# Patient Record
Sex: FEMALE | Race: WHITE | NOT HISPANIC OR LATINO | Employment: OTHER | URBAN - METROPOLITAN AREA
[De-identification: names, ages, dates, MRNs, and addresses within clinical notes are randomized per-mention and may not be internally consistent; named-entity substitution may affect disease eponyms.]

---
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
## 2023-01-27 ASSESSMENT — ANXIETY QUESTIONNAIRES
2. NOT BEING ABLE TO STOP OR CONTROL WORRYING: NEARLY EVERY DAY
8. IF YOU CHECKED OFF ANY PROBLEMS, HOW DIFFICULT HAVE THESE MADE IT FOR YOU TO DO YOUR WORK, TAKE CARE OF THINGS AT HOME, OR GET ALONG WITH OTHER PEOPLE?: EXTREMELY DIFFICULT
7. FEELING AFRAID AS IF SOMETHING AWFUL MIGHT HAPPEN: NEARLY EVERY DAY
GAD7 TOTAL SCORE: 20
IF YOU CHECKED OFF ANY PROBLEMS ON THIS QUESTIONNAIRE, HOW DIFFICULT HAVE THESE PROBLEMS MADE IT FOR YOU TO DO YOUR WORK, TAKE CARE OF THINGS AT HOME, OR GET ALONG WITH OTHER PEOPLE: EXTREMELY DIFFICULT
6. BECOMING EASILY ANNOYED OR IRRITABLE: MORE THAN HALF THE DAYS
7. FEELING AFRAID AS IF SOMETHING AWFUL MIGHT HAPPEN: NEARLY EVERY DAY
GAD7 TOTAL SCORE: 20
5. BEING SO RESTLESS THAT IT IS HARD TO SIT STILL: NEARLY EVERY DAY
GAD7 TOTAL SCORE: 20
3. WORRYING TOO MUCH ABOUT DIFFERENT THINGS: NEARLY EVERY DAY
1. FEELING NERVOUS, ANXIOUS, OR ON EDGE: NEARLY EVERY DAY
4. TROUBLE RELAXING: NEARLY EVERY DAY

## 2023-02-02 ENCOUNTER — APPOINTMENT (OUTPATIENT)
Dept: GENERAL RADIOLOGY | Facility: CLINIC | Age: 18
End: 2023-02-02
Attending: EMERGENCY MEDICINE
Payer: COMMERCIAL

## 2023-02-02 ENCOUNTER — HOSPITAL ENCOUNTER (EMERGENCY)
Facility: CLINIC | Age: 18
Discharge: HOME | End: 2023-02-02
Attending: EMERGENCY MEDICINE | Admitting: EMERGENCY MEDICINE
Payer: COMMERCIAL

## 2023-02-02 ENCOUNTER — OFFICE VISIT (OUTPATIENT)
Dept: URGENT CARE | Facility: URGENT CARE | Age: 18
End: 2023-02-02
Payer: COMMERCIAL

## 2023-02-02 VITALS
SYSTOLIC BLOOD PRESSURE: 124 MMHG | DIASTOLIC BLOOD PRESSURE: 84 MMHG | TEMPERATURE: 97.5 F | HEART RATE: 78 BPM | RESPIRATION RATE: 20 BRPM | OXYGEN SATURATION: 98 %

## 2023-02-02 VITALS
SYSTOLIC BLOOD PRESSURE: 120 MMHG | TEMPERATURE: 98 F | RESPIRATION RATE: 20 BRPM | DIASTOLIC BLOOD PRESSURE: 78 MMHG | OXYGEN SATURATION: 98 % | HEART RATE: 78 BPM

## 2023-02-02 DIAGNOSIS — S62.615A CLOSED DISPLACED FRACTURE OF PROXIMAL PHALANX OF LEFT RING FINGER, INITIAL ENCOUNTER: ICD-10-CM

## 2023-02-02 PROCEDURE — 73140 X-RAY EXAM OF FINGER(S): CPT | Mod: LT

## 2023-02-02 PROCEDURE — 26725 TREAT FINGER FRACTURE EACH: CPT | Mod: F8

## 2023-02-02 PROCEDURE — 99284 EMERGENCY DEPT VISIT MOD MDM: CPT

## 2023-02-02 RX ORDER — TRAMADOL HYDROCHLORIDE 50 MG/1
50 TABLET ORAL EVERY 6 HOURS PRN
Qty: 10 TABLET | Refills: 0 | Status: SHIPPED | OUTPATIENT
Start: 2023-02-02 | End: 2023-02-05

## 2023-02-02 RX ORDER — BUPIVACAINE HYDROCHLORIDE 5 MG/ML
5 INJECTION, SOLUTION EPIDURAL; INTRACAUDAL ONCE
Status: DISCONTINUED | OUTPATIENT
Start: 2023-02-02 | End: 2023-02-02 | Stop reason: HOSPADM

## 2023-02-02 ASSESSMENT — ENCOUNTER SYMPTOMS: ARTHRALGIAS: 1

## 2023-02-02 ASSESSMENT — ACTIVITIES OF DAILY LIVING (ADL)
ADLS_ACUITY_SCORE: 35
ADLS_ACUITY_SCORE: 33

## 2023-02-02 NOTE — ED TRIAGE NOTES
Pt arrives with c/o hand injury. Pt has obvious dislocation of left ring finger. Pt reports she slipped and fell while taking out the trash around 1030. CMS intact in triage.      Triage Assessment     Row Name 02/02/23 1149       Triage Assessment (Adult)    Airway WDL WDL       Respiratory WDL    Respiratory WDL WDL       Skin Circulation/Temperature WDL    Skin Circulation/Temperature WDL WDL       Cardiac WDL    Cardiac WDL WDL       Peripheral/Neurovascular WDL    Peripheral Neurovascular WDL WDL       Cognitive/Neuro/Behavioral WDL    Cognitive/Neuro/Behavioral WDL WDL

## 2023-02-02 NOTE — ED PROVIDER NOTES
History     Chief Complaint:  Hand Injury       HPI   Sera Card is a 17 year old female who presents with a left hand injury. Patient reports that she slipped and fell while taking out the trash. She has obvious deformity in the left fourth finger. The patient denies hitting her head during the fall. Patient is right handed. Patient denies any other complaints.     Independent Historian: Patients mother was present but provided no pertinent history.     Review of External Notes: No recent visits for review.    ROS:  Review of Systems   Musculoskeletal: Positive for arthralgias.   All other systems reviewed and are negative.    Allergies:  No Known Allergies     Medications:    Fluoxetine  fluarix    Past Medical History:    Warts of foot    Family History:    Anxiety  Cancer  Depression x 2  diabetes    Social History:  Reports that she has never smoked. She has never used smokeless tobacco. She reports that she does not drink alcohol and does not use drugs.    Physical Exam     Patient Vitals for the past 24 hrs:   BP Temp Temp src Pulse Resp SpO2   02/02/23 1148 (!) 135/94 97.5  F (36.4  C) Temporal 117 20 100 %      Physical Exam  Constitutional: Alert, attentive, GCS 15   HENT:   Eyes: EOM are normal, anicteric, conjugate gaze  CV: distal extremities warm, well perfused  Chest: Non-labored breathing on RA  MSK: Left fourth digit is ulnarly deviated at the MTP. Distal sensation and cap refill intact.   Neurological: Alert, attentive, moving all extremities equally.   Skin: Skin is warm and dry.    Emergency Department Course     Imaging:  Fingers XR, 2-3 views, left   Final Result   IMPRESSION: Horizontal fracture through the proximal metaphysis of the   fourth finger proximal phalanx with ulnar deviation of the fourth   finger. No additional fractures are evident.      ANDREW GREEN MD            SYSTEM ID:  [id number]         Report per radiology    Laboratory:  Labs Ordered and Resulted from Time  of ED Arrival to Time of ED Departure - No data to display     Procedures      Digital Block       Procedure: Digital Block    Indication: Wound care    Consent: Verbal    Preparation: Betadine    Anesthesia: Trans thecal digital block was performed with Bupivacaine - 0.5% on the affected digit.     Location: L fourth (ring) finger    Procedure Detail: A digital block was performed on the indicated digit with the above anesthetic.     Patient status: The patient tolerated the procedure well: Yes.  She had incomplete anesthesia so a conventional digital block was performed in a similar fashion with Betadine.  This resulted in complete anesthesia.      Fracture Reduction     Procedure: Fracture Reduction     Indication: Fracture    Location: Left fourth finger    Consent: Verbal from Patient   Risks (including but not limited to: neurologic compromise, vascular injury, pain, and inability to reduce fracture), benefits, and alternatives were discussed with patient and consent for procedure was obtained.     Universal Protocol: Universal protocol was followed and time out conducted just prior to starting procedure, confirming patient identity, site/side, procedure, patient position, and availability of correct equipment and implants.     Anesthesia/Sedation: Digital block as above    Procedure Detail: I manually manipulated and reduced the fracture incompletely, I was able to reduce it approximately 50 to 60% into a much more anatomical alignment but it was not completely reduced despite several attempts.  She was placed in a intrinsic plus/ulnar gutter    Patient Status:  The patient tolerated the procedure well: Yes. There were no complications.    Emergency Department Course & Assessments:    Interventions:  Medications   bupivacaine (MARCAINE) 0.5% preservative free injection (has no administration in time range)        Independent Interpretation (X-rays, CTs, rhythm strip):  I independently reviewed the patient's  Xray.      Assessments:  1307 I examined the patient and obtained history as noted above.  1349 I rechecked the patient and completed digital block. See above procedure note.      Disposition:  The patient was discharged to home.     Impression & Plan      Medical Decision Makin-year-old right-hand-dominant woman presenting for evaluation of slip and fall landing on her outstretched left hand clipping on the edge of a brick wall.  She denies hitting her head has no neck pain she has a clear deformity to her left fourth digit.  X-rays confirm transverse fracture at the base of the proximal fourth digit phalanx without clear evidence of intra-articular spread.  I did perform a chance the full than classic digital block to get additional anesthesia I was able to partially reduce this improving alignment by 50 to 60% from its ulnar deviation.  I splinted her in intrinsic plus position of comfort, we will have her follow-up with hand surgery.  I did place a referral to the hand surgery line.  Return precautions reviewed she was discharged home.      Diagnosis:    ICD-10-CM    1. Closed displaced fracture of proximal phalanx of left ring finger, initial encounter  S62.615A            Discharge Medications:  New Prescriptions    TRAMADOL (ULTRAM) 50 MG TABLET    Take 1 tablet (50 mg) by mouth every 6 hours as needed for severe pain (7-10)     Roel Medina MD  [employer]  2:55 PM 23        Scribe Disclosure:  I, Ruiz Schilling, am serving as a scribe at 1:07 PM on 2023 to document services personally performed by Roel Medina MD based on my observations and the provider's statements to me.     2023   Roel Medina MD Dunbar, John Forrest, MD  23 [phone]

## 2023-02-02 NOTE — DISCHARGE INSTRUCTIONS
I recommend altering dose of Tylenol and ibuprofen to help with your pain.  You should elevate your hand above your heart as this will help with the swelling.  You can ice your hand through your splint but your splint cannot get wet.  Your splint should not get wet when you shower as well.  You can take the tramadol as needed for more severe pain.  Should you develop severe pain, or persistent tingling to your finger or notice your finger changes colors beyond bruising you should return here to the emergency department.

## 2023-02-03 ENCOUNTER — HOSPITAL ENCOUNTER (OUTPATIENT)
Dept: BEHAVIORAL HEALTH | Facility: CLINIC | Age: 18
Discharge: HOME | End: 2023-02-03
Attending: FAMILY MEDICINE
Payer: COMMERCIAL

## 2023-02-03 DIAGNOSIS — F41.9 ANXIETY: ICD-10-CM

## 2023-02-03 DIAGNOSIS — F33.1 MODERATE EPISODE OF RECURRENT MAJOR DEPRESSIVE DISORDER (H): ICD-10-CM

## 2023-02-03 PROBLEM — F32.A DEPRESSION: Status: ACTIVE | Noted: 2023-02-03

## 2023-02-03 PROCEDURE — 999N000216 HC STATISTIC ADULT CD FACE TO FACE-NO CHRG: Mod: GT,95 | Performed by: COUNSELOR

## 2023-02-03 ASSESSMENT — COLUMBIA-SUICIDE SEVERITY RATING SCALE - C-SSRS
2. HAVE YOU ACTUALLY HAD ANY THOUGHTS OF KILLING YOURSELF IN THE PAST MONTH?: NO
1. IN THE PAST MONTH, HAVE YOU WISHED YOU WERE DEAD OR WISHED YOU COULD GO TO SLEEP AND NOT WAKE UP?: NO
5. HAVE YOU STARTED TO WORK OUT OR WORKED OUT THE DETAILS OF HOW TO KILL YOURSELF? DO YOU INTEND TO CARRY OUT THIS PLAN?: NO
6. HAVE YOU EVER DONE ANYTHING, STARTED TO DO ANYTHING, OR PREPARED TO DO ANYTHING TO END YOUR LIFE?: NO
3. HAVE YOU BEEN THINKING ABOUT HOW YOU MIGHT KILL YOURSELF?: NO
4. HAVE YOU HAD THESE THOUGHTS AND HAD SOME INTENTION OF ACTING ON THEM?: NO

## 2023-02-03 ASSESSMENT — PATIENT HEALTH QUESTIONNAIRE - PHQ9: SUM OF ALL RESPONSES TO PHQ QUESTIONS 1-9: 19

## 2023-02-03 NOTE — PROGRESS NOTES
"      [facility]    ADULT Mental Health Assessment Appointment Note    Patient name:  Sera Card    Preferred Pronoun:   MRN: [id number]  YOB: 2005  Address:  [address] [place] [address]  Preferred phone: [phone]   May we leave a referral related message: Yes    Date of service: 23  Start time: 1:30pm   End time: 2:00pm   Service modality:  Video Visit:      Provider verified identity through the following two step process.  Patient provided:  Patient  and Patient address    Telemedicine Visit: The patient's condition can be safely assessed and treated via synchronous audio and visual telemedicine encounter.      Reason for Telemedicine Visit: Services only offered telehealth    Originating Site (Patient Location): Patient's home    Distant Site (Provider Location): [facility]    Consent:  The patient/guardian has verbally consented to: the potential risks and benefits of telemedicine (video visit) versus in person care; bill my insurance or make self-payment for services provided; and responsibility for payment of non-covered services.     Patient would like the video invitation sent by:  My Chart    Mode of Communication:  Video Conference via [employer]    Distant Location (Provider):  Off-site    As the provider I attest to compliance with applicable laws and regulations related to telemedicine.    Identifying Information:  Patient is a 17 year old,  Patient was referred for an assessment by self.  Patient attended the session alone. Patient identified their preferred language to be English. Patient reported they does not need the assistance of an  or other support involved in therapy.     Services Requested:   The reason for seeking services at this time is: \" Mental Health \"  Patient has attempted to resolve these concerns in the past.  Patient does not have legal " concerns.    Mental Health:  Review of Symptoms per patient report:  Depression: Change in sleep, Lack of interest, Excessive or inappropriate guilt, Change in energy level, Difficulties concentrating, Psychomotor slowing or agitation, Feelings of hopelessness, Feelings of helplessness, Low self-worth, Ruminations, Irritability, Feeling sad, down, or depressed, Withdrawn, Poor hygeine and Frequent crying  Bailee:  No Symptoms  Psychosis: No Symptoms  Anxiety: Excessive worry, Nervousness, Physical complaints, such as headaches, stomachaches, muscle tension, Separation anxiety, Social anxiety, Sleep disturbance, Psychomotor agitation, Ruminations, Poor concentration and Irritability  Panic:  Palpitations, Tremors, Shortness of breath and Sense of impending doom  Post Traumatic Stress Disorder:  No Symptoms   Eating Disorder: No Symptoms  ADD / ADHD:  Inattentive, Difficulties listening, Poor task completion, Poor organizational skills, Distractibility, Forgetful, Interrupts, Impulsive, Restlessness/fidgety and Hyperverbal  Conduct Disorder: No symptoms  Autism Spectrum Disorder: Deficits in social communication and social interactions  Obsessive Compulsive Disorder: No Symptoms    Patient reports the following compulsive behaviors and treatment history: Nail biting .        Substance Use:  Do you  have a history of alcohol or illicit drug use? Cannabis last used 01/27/2023      Significant Losses / Trauma / Abuse / Neglect Issues:   Patient did not serve in the .  There are indications or report of significant loss, trauma, abuse or neglect issues related to: are no indications and client denies any losses, trauma, abuse, or neglect concerns.  Concerns for possible neglect are not present.     Medical History:  Patient reports the following current medical concerns: Broken finger and may have to get surgery soon.  Patient denies any issues with pain..   There are not significant appetite / nutritional concerns  / weight changes.   Patient does not report a history of head injury / trauma / cognitive impairment.      Current Mental Status Exam:   Appearance:  Appropriate    Eye Contact:  Good   Psychomotor:  Agitated   Attitude / Demeanor: Cooperative   Speech Rate:  Normal/ Responsive  Volume:  Normal  volume  Language:  no problems  Mood:   Anxious  Agitated  Affect:   Appropriate    Thought Content: Clear   Thought Process: Coherent     Associations:  No loosening of associations  Insight:   Good   Judgment:  Intact   Orientation:  All  Attention:  Good    Rating Scales:  PHQ9:    PHQ-9 SCORE 12/8/2020 1/5/2021 2/3/2023   PHQ-9 Total Score - - 19   PHQ-A Total Score 17 11 -   ;    GAD7:    JULIANO-7 SCORE 12/8/2020 1/5/2021 1/27/2023   Total Score - - 20 (severe anxiety)   Total Score 18 11 20     Banks Suicide Severity Rating Scale (Lifetime/Recent)  Banks Suicide Severity Rating (Lifetime/Recent) 2/3/2023   Q1 Wished to be Dead (Past Month) no   Q2 Suicidal Thoughts (Past Month) no   Q3 Suicidal Thought Method no   Q4 Suicidal Intent without Specific Plan no   Q5 Suicide Intent with Specific Plan no   Q6 Suicide Behavior (Lifetime) no   Level of Risk per Screen low risk       Safety Assessment:   Patient denies current homicidal ideation and behaviors.  Patient denies current self-injurious ideation and behaviors.    Patient denied risk behaviors associated with substance use.  Patient denies any high risk behaviors associated with mental health symptoms.  Patient reports the following current concerns for their personal safety: None.  Patient reports there are not  firearms in the house. There are no firearms in the home..     Clinical Impressions:  Generalized Anxiety Disorder  A. Excessive anxiety and worry about a number of events or activities (such as work or school performance).   B. The person finds it difficult to control the worry.   - Restlessness or feeling keyed up or on edge.    - Being easily fatigued.     - Difficulty concentrating or mind going blank.    - Irritability.    - Muscle tension.    - Sleep disturbance (difficulty falling or staying asleep, or restless unsatisfying sleep).   D. The focus of the anxiety and worry is not confined to features of an Axis I disorder.  E. The anxiety, worry, or physical symptoms cause clinically significant distress or impairment in social, occupational, or other important areas of functioning.   F. The disturbance is not due to the direct physiological effects of a substance (e.g., a drug of abuse, a medication) or a general medical condition (e.g., hyperthyroidism) and does not occur exclusively during a Mood Disorder, a Psychotic Disorder, or a Pervasive Developmental Disorder. Major Depressive Disorder  A) Recurrent episode(s) - symptoms have been present during the same 2-week period and represent a change from previous functioning 5 or more symptoms (required for diagnosis)   - Depressed mood. Note: In children and adolescents, can be irritable mood.     - Diminished interest or pleasure in all, or almost all, activities.    - Psychomotor activity agitation.    - Fatigue or loss of energy.    - Feelings of worthlessness or inappropriate and excessive guilt.    - Diminished ability to think or concentrate, or indecisiveness.   B) The symptoms cause clinically significant distress or impairment in social, occupational, or other important areas of functioning  C) The episode is not attributable to the physiological effects of a substance or to another medical condition  D) The occurence of major depressive episode is not better explained by other thought / psychotic disorders  E) There has never been a manic episode or hypomanic episode      Clinical Substantiation:  Summary of Visit: Patient is a 17 year old female with no previous mental health history. Patient is experiencing symptoms of depression and anxiety.  Patient presented extremely nervous. Clinician explained  the DA to pt and the process. Pt would like therapy and psychiatry. Pt does not have a history of psychiatric hospitalizations. Patient does not have a history of SI, SA or SIB. Denies any concerns with current alcohol use.  Patient would benefit from additional support and psycho education to manage current mental health symptoms.    The patient's acute suicide risk was determined to be low due to the following factors: Denial of suicidal thoughts, no history of suicide attempts. Patient is not currently under the influence of alcohol or illicit substances, denies experiencing command hallucinations, and has no immediate access to firearms. The patient's acute risk could be higher if noncompliant with their follow-up appointments or using illicit substances or alcohol.    Therapeutic Interventions Provided: supportive active listening, explored alternatives and emotional validation    Recommendations/Plan: therapy and medication management    Referral:     Date: Saturday, 2/4/2023  Time: 9:00 am - 10:00 am  Provider: Loreta Lindo MA  LMFT  Location: [facility], [address] [place] [address], [address], [place] [address]  Phone: [phone]  Type: Teletherapy    Date: Wednesday, 2/8/2023  Time: 2:00 pm - 3:00 pm  Provider: Ashlie MADRIGAL PA-C  Location: [facility], [address], [address], [place] [address]  Phone: [phone]  Type: Telepsychiatry    Patient Instructions  Please fill New Patient Form by using following link. All forms need to be completed 24 hours prior to the appointment date/time by going to [email or web address] Please call us on [phone] 24 hours prior to your scheduled appointment to confirm that you are able to attend. We will provide you information about how to log into video call software when you call.    Claudia Mendez, [facility],  February 3, 2023  Mental Health and Addiction Services Assessment Center

## 2023-02-03 NOTE — PATIENT INSTRUCTIONS
Date: Saturday, 2/4/2023  Time: 9:00 am - 10:00 am  Provider: Loreta Lindo MA  LMFT  Location: [facility], [address] [facility], [address], [place] [address]  Phone: [phone]  Type: Teletherapy    Date: Wednesday, 2/8/2023  Time: 2:00 pm - 3:00 pm  Provider: Ashlie MADRIGAL PA-C  Location: [facility], [address] [place] [address], [address], [place] [address]  Phone: [phone]  Type: Telepsychiatry    Patient Instructions  Please fill New Patient Form by using following link. All forms need to be completed 24 hours prior to the appointment date/time by going to [email or web address] Please call us on [phone] 24 hours prior to your scheduled appointment to confirm that you are able to attend. We will provide you information about how to log into video call software when you call.

## 2023-04-06 ENCOUNTER — OFFICE VISIT (OUTPATIENT)
Dept: OPTOMETRY | Facility: CLINIC | Age: 18
End: 2023-04-06
Payer: COMMERCIAL

## 2023-04-06 DIAGNOSIS — H52.223 REGULAR ASTIGMATISM OF BOTH EYES: ICD-10-CM

## 2023-04-06 DIAGNOSIS — Z01.00 EXAMINATION OF EYES AND VISION: Primary | ICD-10-CM

## 2023-04-06 PROCEDURE — 92004 COMPRE OPH EXAM NEW PT 1/>: CPT | Performed by: OPTOMETRIST

## 2023-04-06 PROCEDURE — 92015 DETERMINE REFRACTIVE STATE: CPT | Performed by: OPTOMETRIST

## 2023-04-06 RX ORDER — HYDROXYZINE HYDROCHLORIDE 50 MG/1
TABLET, FILM COATED ORAL
COMMUNITY
Start: 2023-04-04

## 2023-04-06 ASSESSMENT — TONOMETRY
OS_IOP_MMHG: 10
IOP_METHOD: TONOPEN
OD_IOP_MMHG: 13

## 2023-04-06 ASSESSMENT — REFRACTION_WEARINGRX
OD_CYLINDER: +0.75
OD_AXIS: 105
OS_CYLINDER: +0.50
OS_SPHERE: -0.50
OD_SPHERE: -0.75
OS_AXIS: 062
SPECS_TYPE: DOES NOT USE

## 2023-04-06 ASSESSMENT — REFRACTION_MANIFEST
OD_SPHERE: -0.50
OS_SPHERE: -0.75
OS_CYLINDER: +0.75
OS_AXIS: 062
OD_AXIS: 105
OD_CYLINDER: +0.75
METHOD_AUTOREFRACTION: 1

## 2023-04-06 ASSESSMENT — VISUAL ACUITY
OS_SC: 20/25
OD_SC: 20/25
OD_SC: 20/20-1
METHOD: SNELLEN - LINEAR
OS_SC: 20/20-1
OS_SC+: -1
OD_SC+: -2

## 2023-04-06 ASSESSMENT — SLIT LAMP EXAM - LIDS
COMMENTS: NORMAL
COMMENTS: NORMAL

## 2023-04-06 ASSESSMENT — EXTERNAL EXAM - LEFT EYE: OS_EXAM: NORMAL

## 2023-04-06 ASSESSMENT — CONF VISUAL FIELD
OD_NORMAL: 1
OS_INFERIOR_TEMPORAL_RESTRICTION: 0
OD_SUPERIOR_TEMPORAL_RESTRICTION: 0
OS_NORMAL: 1
OS_INFERIOR_NASAL_RESTRICTION: 0
OD_SUPERIOR_NASAL_RESTRICTION: 0
OD_INFERIOR_NASAL_RESTRICTION: 0
OS_SUPERIOR_TEMPORAL_RESTRICTION: 0
OD_INFERIOR_TEMPORAL_RESTRICTION: 0
OS_SUPERIOR_NASAL_RESTRICTION: 0

## 2023-04-06 ASSESSMENT — EXTERNAL EXAM - RIGHT EYE: OD_EXAM: NORMAL

## 2023-04-06 ASSESSMENT — CUP TO DISC RATIO
OS_RATIO: 0.1
OD_RATIO: 0.1

## 2023-04-06 ASSESSMENT — KERATOMETRY
OD_K2POWER_DIOPTERS: 45.50
OD_K1POWER_DIOPTERS: 44.25
OS_AXISANGLE_DEGREES: 073
OD_AXISANGLE2_DEGREES: 013
OD_AXISANGLE_DEGREES: 103
OS_AXISANGLE2_DEGREES: 163
OS_K1POWER_DIOPTERS: 44.25
OS_K2POWER_DIOPTERS: 45.50

## 2023-04-06 NOTE — PROGRESS NOTES
Chief Complaint   Patient presents with     Annual Eye Exam         Last Eye Exam: 10-  Dilated Previously: Yes    What are you currently using to see?  does not use glasses or contacts       Distance Vision Acuity: Noticed gradual change in both eyes    Near Vision Acuity: Satisfied with vision while reading  unaided    Eye Comfort: good  Do you use eye drops? : No  Occupation or Hobbies: student - going to [employer]    Keren Galvan Optometric Assistant, A.B.O.CDebbie      Medical, surgical and family histories reviewed and updated 4/6/2023.       OBJECTIVE: See Ophthalmology exam    ASSESSMENT:    ICD-10-CM    1. Examination of eyes and vision  Z01.00       2. Regular astigmatism of both eyes  H52.223           PLAN:     Patient Instructions   Eyeglass prescription given.    Return in 1 year for a complete eye exam or sooner if needed.    Colin Barrientos, OD

## 2023-04-06 NOTE — LETTER
4/6/2023         RE: Sera Card  [address] [place] [address]        Dear Colleague,    Thank you for referring your patient, Sera Card, to the [facility]. Please see a copy of my visit note below.    Chief Complaint   Patient presents with     Annual Eye Exam         Last Eye Exam: 10-  Dilated Previously: Yes    What are you currently using to see?  does not use glasses or contacts       Distance Vision Acuity: Noticed gradual change in both eyes    Near Vision Acuity: Satisfied with vision while reading  unaided    Eye Comfort: good  Do you use eye drops? : No  Occupation or Hobbies: student - going to [employer]    Keren Galvan Optometric Assistant, A.B.ODebbieC.      Medical, surgical and family histories reviewed and updated 4/6/2023.       OBJECTIVE: See Ophthalmology exam    ASSESSMENT:    ICD-10-CM    1. Examination of eyes and vision  Z01.00       2. Regular astigmatism of both eyes  H52.223           PLAN:     Patient Instructions   Eyeglass prescription given.    Return in 1 year for a complete eye exam or sooner if needed.    Colin Barrientos, SANTHOSH           Again, thank you for allowing me to participate in the care of your patient.        Sincerely,        Colin Barrientos, OD

## 2023-04-06 NOTE — PATIENT INSTRUCTIONS
Eyeglass prescription given.    Return in 1 year for a complete eye exam or sooner if needed.    Colin Barrientos, SANTHOSH    The affects of the dilating drops last for 4- 6 hours.  You will be more sensitive to light and vision will be blurry up close.  Do not drive if you do not feel comfortable.  Mydriatic sunglasses were given if needed.      Optometry Providers       Clinic Locations                                 Telephone Number   Dr. Macy Schilling [place]    [facility]  [facility] [phone]     [place] [facility] Hours:                [place] [facility] Hours:       [place] [facility] Hours:   [address]   [address]      [address] [place] [address]   [place] [address]    [place] [address]  Phone: [phone]                    Phone: [phone]     Phone: [phone]                      Monday 8:00-6:00                          Monday 8:00-6:00                          Monday 8:00-6:00              Tuesday 8:00-6:00                          Tuesday 8:00-6:00                          Tuesday 8:00-6:00              Wednesday 8:00-6:00                  Wednesday 8:00-6:00                   Wednesday 8:00-6:00      Thursday 8:00-6:00                        Thursday 8:00-6:00                         Thursday 8:00-6:00            Friday 8:00-5:00                              Friday 8:00-5:00                              Friday 8:00-5:00    [facility] Hours:   [address] [place], [place] [id number] [phone]    Monday 9:00-6:00  Tuesday 9:00-6:00  Wednesday 9:00-6:00  Thursday 9:00-6:00  Friday 9:00-5:00  As always, Thank you for trusting us with your health care needs!

## 2023-10-07 ENCOUNTER — HEALTH MAINTENANCE LETTER (OUTPATIENT)
Age: 18
End: 2023-10-07

## 2023-10-13 ENCOUNTER — OFFICE VISIT (OUTPATIENT)
Dept: FAMILY MEDICINE | Facility: CLINIC | Age: 18
End: 2023-10-13
Payer: COMMERCIAL

## 2023-10-13 VITALS
SYSTOLIC BLOOD PRESSURE: 108 MMHG | RESPIRATION RATE: 10 BRPM | TEMPERATURE: 98.2 F | WEIGHT: 114.8 LBS | BODY MASS INDEX: 18.45 KG/M2 | HEIGHT: 66 IN | DIASTOLIC BLOOD PRESSURE: 64 MMHG | HEART RATE: 77 BPM | OXYGEN SATURATION: 99 %

## 2023-10-13 DIAGNOSIS — K58.2 IRRITABLE BOWEL SYNDROME WITH BOTH CONSTIPATION AND DIARRHEA: Primary | ICD-10-CM

## 2023-10-13 DIAGNOSIS — F32.1 CURRENT MODERATE EPISODE OF MAJOR DEPRESSIVE DISORDER WITHOUT PRIOR EPISODE (H): ICD-10-CM

## 2023-10-13 DIAGNOSIS — K59.4 RECTAL SPHINCTER SPASM: ICD-10-CM

## 2023-10-13 DIAGNOSIS — Z11.4 SCREENING FOR HIV (HUMAN IMMUNODEFICIENCY VIRUS): ICD-10-CM

## 2023-10-13 DIAGNOSIS — Z11.59 NEED FOR HEPATITIS C SCREENING TEST: ICD-10-CM

## 2023-10-13 LAB
BASO+EOS+MONOS # BLD AUTO: NORMAL 10*3/UL
BASO+EOS+MONOS NFR BLD AUTO: NORMAL %
BASOPHILS # BLD AUTO: 0 10E3/UL (ref 0–0.2)
BASOPHILS NFR BLD AUTO: 0 %
EOSINOPHIL # BLD AUTO: 0.1 10E3/UL (ref 0–0.7)
EOSINOPHIL NFR BLD AUTO: 2 %
ERYTHROCYTE [DISTWIDTH] IN BLOOD BY AUTOMATED COUNT: 13.4 % (ref 10–15)
HCT VFR BLD AUTO: 38 % (ref 35–47)
HGB BLD-MCNC: 12.3 G/DL (ref 11.7–15.7)
IMM GRANULOCYTES # BLD: 0 10E3/UL
IMM GRANULOCYTES NFR BLD: 0 %
LYMPHOCYTES # BLD AUTO: 1.7 10E3/UL (ref 0.8–5.3)
LYMPHOCYTES NFR BLD AUTO: 32 %
MCH RBC QN AUTO: 31.5 PG (ref 26.5–33)
MCHC RBC AUTO-ENTMCNC: 32.4 G/DL (ref 31.5–36.5)
MCV RBC AUTO: 97 FL (ref 78–100)
MONOCYTES # BLD AUTO: 0.3 10E3/UL (ref 0–1.3)
MONOCYTES NFR BLD AUTO: 6 %
NEUTROPHILS # BLD AUTO: 3.2 10E3/UL (ref 1.6–8.3)
NEUTROPHILS NFR BLD AUTO: 60 %
PLATELET # BLD AUTO: 242 10E3/UL (ref 150–450)
RBC # BLD AUTO: 3.91 10E6/UL (ref 3.8–5.2)
WBC # BLD AUTO: 5.4 10E3/UL (ref 4–11)

## 2023-10-13 PROCEDURE — 90471 IMMUNIZATION ADMIN: CPT | Performed by: FAMILY MEDICINE

## 2023-10-13 PROCEDURE — 90686 IIV4 VACC NO PRSV 0.5 ML IM: CPT | Performed by: FAMILY MEDICINE

## 2023-10-13 PROCEDURE — 87389 HIV-1 AG W/HIV-1&-2 AB AG IA: CPT | Performed by: FAMILY MEDICINE

## 2023-10-13 PROCEDURE — 80053 COMPREHEN METABOLIC PANEL: CPT | Performed by: FAMILY MEDICINE

## 2023-10-13 PROCEDURE — 90480 ADMN SARSCOV2 VAC 1/ONLY CMP: CPT | Performed by: FAMILY MEDICINE

## 2023-10-13 PROCEDURE — 85025 COMPLETE CBC W/AUTO DIFF WBC: CPT | Performed by: FAMILY MEDICINE

## 2023-10-13 PROCEDURE — 84443 ASSAY THYROID STIM HORMONE: CPT | Performed by: FAMILY MEDICINE

## 2023-10-13 PROCEDURE — 91320 SARSCV2 VAC 30MCG TRS-SUC IM: CPT | Performed by: FAMILY MEDICINE

## 2023-10-13 PROCEDURE — 99214 OFFICE O/P EST MOD 30 MIN: CPT | Mod: 25 | Performed by: FAMILY MEDICINE

## 2023-10-13 PROCEDURE — 36415 COLL VENOUS BLD VENIPUNCTURE: CPT | Performed by: FAMILY MEDICINE

## 2023-10-13 PROCEDURE — 86803 HEPATITIS C AB TEST: CPT | Performed by: FAMILY MEDICINE

## 2023-10-13 RX ORDER — TRAZODONE HYDROCHLORIDE 50 MG/1
50 TABLET, FILM COATED ORAL AT BEDTIME
COMMUNITY

## 2023-10-13 RX ORDER — CLONIDINE HYDROCHLORIDE 0.1 MG/1
0.1 TABLET, EXTENDED RELEASE ORAL DAILY
COMMUNITY
Start: 2023-10-11 | End: 2023-11-29

## 2023-10-13 ASSESSMENT — PATIENT HEALTH QUESTIONNAIRE - PHQ9
10. IF YOU CHECKED OFF ANY PROBLEMS, HOW DIFFICULT HAVE THESE PROBLEMS MADE IT FOR YOU TO DO YOUR WORK, TAKE CARE OF THINGS AT HOME, OR GET ALONG WITH OTHER PEOPLE: SOMEWHAT DIFFICULT
SUM OF ALL RESPONSES TO PHQ QUESTIONS 1-9: 16
SUM OF ALL RESPONSES TO PHQ QUESTIONS 1-9: 16

## 2023-10-13 ASSESSMENT — PAIN SCALES - GENERAL: PAINLEVEL: NO PAIN (0)

## 2023-10-13 NOTE — PROGRESS NOTES
Assessment & Plan     (K58.2) Irritable bowel syndrome with both constipation and diarrhea  (primary encounter diagnosis)  Comment: pt  has gi problems for one month: sometimes she had hard stool and difficulty to pass stool. Sometimes she had diarrhea. Sometimes she felt low abd pain. Symptoms not related to any food. Denies fever, diet change, weight loss, n/v. She is someday smoker and she uses marijuana.  Denies alcohol and spicy food. She works as nanny. Exam is not remarkable. Likely IBS. Vs constipation.   Plan: CBC with platelets and differential, TSH with         free T4 reflex, Comprehensive metabolic panel         (BMP + Alb, Alk Phos, ALT, AST, Total. Bili,         TP)        Will follow-up lab, advise to push water. Increase diet fiber such as sweet potato, banana, oatmeal, all vegetable etc. Quit smoke. Will follow-up in one month. If symptoms worse will consider gi consult.     (K59.4) Rectal sphincter spasm  Comment:  pt state sometimes when she has constipation, she feels rectal spasm and painful. No blood in stool. Rectal exam is normal. Likely rectal sphincter spasm  Plan: advise to avoid constipation by pushing water and diet fiber. Consider otc stool softer if she continue to have constipation.     (Z11.4) Screening for HIV (human immunodeficiency virus)  Comment:   Plan: HIV Antigen Antibody Combo            (Z11.59) Need for hepatitis C screening test  Comment:   Plan: Hepatitis C Screen Reflex to HCV RNA Quant and         Genotype            (F32.1) Current moderate episode of major depressive disorder without prior episode (H)  Comment: pt takes medication and follow-up with psychiatrist. Doing well  Plan: continue current plan.           Nicotine/Tobacco Cessation:  She reports that she has been smoking cigarettes. She has never used smokeless tobacco.  Nicotine/Tobacco Cessation Plan:   Information offered: Patient not interested at this time      Depression Screening Follow Up         10/13/2023     9:42 AM   PHQ   PHQ-9 Total Score 16   Q9: Thoughts of better off dead/self-harm past 2 weeks Not at all         10/13/2023     9:42 AM   Last PHQ-9   1.  Little interest or pleasure in doing things 2   2.  Feeling down, depressed, or hopeless 1   3.  Trouble falling or staying asleep, or sleeping too much 3   4.  Feeling tired or having little energy 3   5.  Poor appetite or overeating 3   6.  Feeling bad about yourself 1   7.  Trouble concentrating 3   8.  Moving slowly or restless 0   Q9: Thoughts of better off dead/self-harm past 2 weeks 0   PHQ-9 Total Score 16       Follow Up Actions Taken       See Patient Instructions. Pt is due for pe and she agrees to follow-up for pe.     Shania Chery MD  [facility]IB[facility] Zamora is a 18 year old, presenting for the following health issues:  Rectal Problem (Rectal pain, bowel pain, straining, frequent urge to go, not fully emptying her colon x 4 months. Has tried miralax and OTC stool softeners and had no relief. Pt has been massaging her muscles in her butt and her abdomen and feels that helps just a moment but not helping with bowel movements. )      10/13/2023     9:50 AM   Additional Questions   Roomed by Isabella SHELL CMA       History of Present Illness       Reason for visit:  Trouble with bowels and those muscles needed for pooping  Symptom onset:  More than a month  Symptoms include:  Straining to poop/involuntary tensing of those muscles/feel like i cant complete bowel movements or something is stuck/pain around butthole muscles/anal discharge  Symptom intensity:  Severe  Symptom progression:  Staying the same  Had these symptoms before:  No  What makes it worse:  Straining  What makes it better:  Putting pressure on those muscles    She eats 2-3 servings of fruits and vegetables daily.She consumes 2 sweetened beverage(s) daily.She exercises with enough effort to increase her heart rate 30 to 60 minutes per day.   "She exercises with enough effort to increase her heart rate 4 days per week. She is missing 2 dose(s) of medications per week.  She is not taking prescribed medications regularly due to remembering to take.          Review of Systems   Constitutional, HEENT, cardiovascular, pulmonary, gi and gu systems are negative, except as otherwise noted.      Objective    /64 (BP Location: Left arm, Patient Position: Sitting, Cuff Size: Adult Regular)   Pulse 77   Temp 98.2  F (36.8  C) (Oral)   Resp 10   Ht 1.664 m (5' 5.5\")   Wt 52.1 kg (114 lb 12.8 oz)   LMP 09/24/2023 (Exact Date)   SpO2 99%   Breastfeeding No   BMI 18.81 kg/m    Body mass index is 18.81 kg/m .  Physical Exam   GENERAL: healthy, alert and no distress  NECK: no adenopathy, no asymmetry, masses, or scars and thyroid normal to palpation  RESP: lungs clear to auscultation - no rales, rhonchi or wheezes  CV: regular rate and rhythm, normal S1 S2, no S3 or S4, no murmur, click or rub, no peripheral edema and peripheral pulses strong  ABDOMEN: soft, nontender, no hepatosplenomegaly, no masses and bowel sounds normal  MS: no gross musculoskeletal defects noted, no edema   Rectum normal without masses, blood.                "

## 2023-10-14 LAB
ALBUMIN SERPL BCG-MCNC: 4.6 G/DL (ref 3.5–5.2)
ALP SERPL-CCNC: 66 U/L (ref 35–104)
ALT SERPL W P-5'-P-CCNC: 13 U/L (ref 0–50)
ANION GAP SERPL CALCULATED.3IONS-SCNC: 9 MMOL/L (ref 7–15)
AST SERPL W P-5'-P-CCNC: 22 U/L (ref 0–45)
BILIRUB SERPL-MCNC: 0.2 MG/DL
BUN SERPL-MCNC: 9.4 MG/DL (ref 6–20)
CALCIUM SERPL-MCNC: 9.4 MG/DL (ref 8.6–10)
CHLORIDE SERPL-SCNC: 101 MMOL/L (ref 98–107)
CREAT SERPL-MCNC: 0.67 MG/DL (ref 0.51–0.95)
DEPRECATED HCO3 PLAS-SCNC: 26 MMOL/L (ref 22–29)
EGFRCR SERPLBLD CKD-EPI 2021: >90 ML/MIN/1.73M2
GLUCOSE SERPL-MCNC: 94 MG/DL (ref 70–99)
HCV AB SERPL QL IA: NONREACTIVE
HIV 1+2 AB+HIV1 P24 AG SERPL QL IA: NONREACTIVE
POTASSIUM SERPL-SCNC: 4.3 MMOL/L (ref 3.4–5.3)
PROT SERPL-MCNC: 7.2 G/DL (ref 6.4–8.3)
SODIUM SERPL-SCNC: 136 MMOL/L (ref 135–145)
TSH SERPL DL<=0.005 MIU/L-ACNC: 1.08 UIU/ML (ref 0.3–4.2)

## 2023-11-17 DIAGNOSIS — F90.0 ATTENTION DEFICIT HYPERACTIVITY DISORDER, INATTENTIVE TYPE: Primary | ICD-10-CM

## 2023-11-29 ENCOUNTER — OFFICE VISIT (OUTPATIENT)
Dept: FAMILY MEDICINE | Facility: CLINIC | Age: 18
End: 2023-11-29
Payer: COMMERCIAL

## 2023-11-29 VITALS
WEIGHT: 107 LBS | DIASTOLIC BLOOD PRESSURE: 70 MMHG | RESPIRATION RATE: 16 BRPM | HEART RATE: 105 BPM | BODY MASS INDEX: 17.83 KG/M2 | TEMPERATURE: 98.1 F | OXYGEN SATURATION: 98 % | SYSTOLIC BLOOD PRESSURE: 108 MMHG | HEIGHT: 65 IN

## 2023-11-29 DIAGNOSIS — R10.2 PELVIC PAIN IN FEMALE: ICD-10-CM

## 2023-11-29 DIAGNOSIS — Z00.00 ROUTINE GENERAL MEDICAL EXAMINATION AT A HEALTH CARE FACILITY: Primary | ICD-10-CM

## 2023-11-29 DIAGNOSIS — K58.1 IRRITABLE BOWEL SYNDROME WITH CONSTIPATION: ICD-10-CM

## 2023-11-29 PROBLEM — F32.A DEPRESSION: Status: RESOLVED | Noted: 2023-02-03 | Resolved: 2023-11-29

## 2023-11-29 PROCEDURE — 90677 PCV20 VACCINE IM: CPT | Performed by: FAMILY MEDICINE

## 2023-11-29 PROCEDURE — 99213 OFFICE O/P EST LOW 20 MIN: CPT | Mod: 25 | Performed by: FAMILY MEDICINE

## 2023-11-29 PROCEDURE — 90471 IMMUNIZATION ADMIN: CPT | Performed by: FAMILY MEDICINE

## 2023-11-29 PROCEDURE — 90472 IMMUNIZATION ADMIN EACH ADD: CPT | Performed by: FAMILY MEDICINE

## 2023-11-29 PROCEDURE — 99395 PREV VISIT EST AGE 18-39: CPT | Mod: 25 | Performed by: FAMILY MEDICINE

## 2023-11-29 PROCEDURE — 90715 TDAP VACCINE 7 YRS/> IM: CPT | Performed by: FAMILY MEDICINE

## 2023-11-29 RX ORDER — ATOMOXETINE 40 MG/1
40 CAPSULE ORAL DAILY
COMMUNITY
Start: 2023-11-17 | End: 2024-04-11

## 2023-11-29 ASSESSMENT — ENCOUNTER SYMPTOMS
HEADACHES: 0
WEAKNESS: 0
SORE THROAT: 0
FREQUENCY: 0
MYALGIAS: 1
COUGH: 0
JOINT SWELLING: 0
DIARRHEA: 0
DIZZINESS: 0
BREAST MASS: 0
NERVOUS/ANXIOUS: 0
NAUSEA: 0
PALPITATIONS: 0
HEARTBURN: 0
FEVER: 0
EYE PAIN: 0
HEMATURIA: 0
PARESTHESIAS: 0
HEMATOCHEZIA: 0
ARTHRALGIAS: 0
ABDOMINAL PAIN: 1
CONSTIPATION: 0
DYSURIA: 0
CHILLS: 0
SHORTNESS OF BREATH: 0

## 2023-11-29 ASSESSMENT — PATIENT HEALTH QUESTIONNAIRE - PHQ9
SUM OF ALL RESPONSES TO PHQ QUESTIONS 1-9: 14
10. IF YOU CHECKED OFF ANY PROBLEMS, HOW DIFFICULT HAVE THESE PROBLEMS MADE IT FOR YOU TO DO YOUR WORK, TAKE CARE OF THINGS AT HOME, OR GET ALONG WITH OTHER PEOPLE: SOMEWHAT DIFFICULT
SUM OF ALL RESPONSES TO PHQ QUESTIONS 1-9: 14

## 2023-11-29 NOTE — COMMUNITY RESOURCES LIST (ENGLISH)
11/29/2023   [facility]  N/A  For additional resource needs, please contact your health insurance member services or your primary care team.  Phone: [phone]   Email: N/A   Address: [address] [place] [address]   Hours: N/A        Food and Nutrition       Food pantry  1  [facility] Distance: 1.29 miles      Pickup   [address] [place] [address]  Language: Upper sorbian, English, Hmong, Macie, Liberian  Hours: Mon 1:00 PM - 4:00 PM , Mon 5:00 PM - 6:30 PM , Tue - Fri 9:00 AM - 11:30 AM , Tue - Fri 1:00 PM - 3:30 PM  Fees: Free   Phone: (154) 612-1558 Website: https://[facility].org/     2  [facility] Distance: 2.15 miles      [address] [address]  Language: English, Hmong, Arabic, Liberian  Hours: Mon 11:00 AM - 3:00 PM , Wed 11:00 AM - 3:00 PM , Fri 11:00 AM - 3:00 PM  Fees: Free, Self Pay   Phone: (240) 141-1736 Email: Edmund@[facility] HospitalWeb Africay.org Website: http://[facility].org/[facility]/[address]/     [facility] application assistance  3  [employer] [place] Distance: 3.22 miles      Phone/Virtual   [address] [place] [address]  Language: English, Hmong, Scottish, Cypriot, Liberian  Hours: Mon - Fri 8:30 AM - 4:30 PM  Fees: Free   Phone: (313) 691-2020 Email: helpline@[employer].org Website: https://www.[employer].org/programs/[place]-food-helpline/     4  [facility] Out[facility] Distance: 1.29 miles      Phone/Virtual   [address] [place] [address]  Language: Upper sorbian, English, Hmong, Macie, Liberian  Hours: Mon - Fri 10:00 AM - 12:00 PM , Mon - Fri 2:00 PM - 4:00 PM  Fees: Free   Phone: (423) 492-5194 Website: https://[facility].org/     Soup kitchen or free meals  [facility] Office - [place] and [facility] Distance: 1.19 miles      [place] [address]  [place] [address]  Language: English  Hours: Mon - Fri 5:00 PM - 6:00 PM  Fees: Free   Phone: (798) 985-8991 Email: mario@[facility] [place] [facility] Website: http://www.[facility] [place].[facility]/     6  [facility] [place] [facility] Distance: 2.53 miles      Pickup   [address] [place] [address]  Language: English  Hours: Tue 2:00 PM - 4:00 PM , Thu 2:00 PM - 4:00 PM  Fees: Free   Phone: (312) 599-9865 Email: roseann@[facility]. Website: https://www.[facility]/facilities/xnkszm-obvwgfoop-nqgteh          Important Numbers & Websites       [address].org  Poison Control   (909) 329-9359 [place]po[place]  Suicide and Crisis Lifeline   [address] [place], [place] [address]line.org  [facility] [place] Child Abuse Hotline   141.869.7984 Childhelphotline.org  National Sexual Assault Hotline   (320) 800-1067 (HOPE) Rainn.[facility]  National Runaway Safeline   (877) 233-2860 (RUNAWAY) Formerly named [facility] Cent[facility]unaway.org  Pregnancy & Postpartum Support [place]   Call/text 819-050-1573 Ppsupport[place].org  Substance Abuse National Helpline ([facility]   611-745-HELP (1886) Findtreatment.gov  Emergency Services   911

## 2023-11-29 NOTE — PROGRESS NOTES
SUBJECTIVE:   Sera is a 18 year old, presenting for the following:  Physical        11/29/2023     9:35 AM   Additional Questions   Roomed by Gen JENNIFER CMA       Healthy Habits:     Getting at least 3 servings of Calcium per day:  NO    Bi-annual eye exam:  Yes    Dental care twice a year:  Yes    Sleep apnea or symptoms of sleep apnea:  Daytime drowsiness    Diet:  Regular (no restrictions)    Frequency of exercise:  1 day/week    Duration of exercise:  30-45 minutes    Taking medications regularly:  Yes    Medication side effects:  None    Additional concerns today:  Yes      Today's PHQ-9 Score:       11/29/2023     8:29 AM   PHQ-9 SCORE   PHQ-9 Total Score MyChart 14 (Moderate depression)   PHQ-9 Total Score 14            Have you ever done Advance Care Planning? (For example, a Health Directive, POLST, or a discussion with a medical provider or your loved ones about your wishes): No, advance care planning information given to patient to review.  Advanced care planning was discussed at today's visit.    Social History     Tobacco Use    Smoking status: Passive Smoke Exposure - Never Smoker    Smokeless tobacco: Current    Tobacco comments:     I would smoke a cig maybe once a day when I worked at a [employer]   Substance Use Topics    Alcohol use: No             11/29/2023     8:41 AM   Alcohol Use   Prescreen: >3 drinks/day or >7 drinks/week? Not Applicable     Reviewed orders with patient.  Reviewed health maintenance and updated orders accordingly - Yes  Lab work is in process  Labs reviewed in EPIC    Breast Cancer Screening:        History of abnormal Pap smear: NO - under age 21, PAP not appropriate for age     Reviewed and updated as needed this visit by clinical staff   Tobacco  Allergies  Meds  Problems  Med Hx  Surg Hx  Fam Hx          Reviewed and updated as needed this visit by Provider   Tobacco  Allergies  Meds  Problems  Med Hx  Surg Hx  Fam Hx             Review of Systems  "  Constitutional:  Negative for chills and fever.   HENT:  Negative for congestion, ear pain, hearing loss and sore throat.    Eyes:  Negative for pain and visual disturbance.   Respiratory:  Negative for cough and shortness of breath.    Cardiovascular:  Negative for chest pain, palpitations and peripheral edema.   Gastrointestinal:  Positive for abdominal pain. Negative for constipation, diarrhea, heartburn, hematochezia and nausea.   Breasts:  Negative for tenderness, breast mass and discharge.   Genitourinary:  Positive for pelvic pain and urgency. Negative for dysuria, frequency, genital sores, hematuria, vaginal bleeding and vaginal discharge.   Musculoskeletal:  Positive for myalgias. Negative for arthralgias and joint swelling.   Skin:  Negative for rash.   Neurological:  Negative for dizziness, weakness, headaches and paresthesias.   Psychiatric/Behavioral:  Negative for mood changes. The patient is not nervous/anxious.           OBJECTIVE:   /70 (BP Location: Right arm, Patient Position: Sitting, Cuff Size: Adult Regular)   Pulse 105   Temp 98.1  F (36.7  C) (Oral)   Resp 16   Ht 1.645 m (5' 4.75\")   Wt 48.5 kg (107 lb)   LMP 11/23/2023 (Exact Date)   SpO2 98%   Breastfeeding No   BMI 17.94 kg/m    Physical Exam  GENERAL: healthy, alert and no distress  EYES: Eyes grossly normal to inspection, PERRL and conjunctivae and sclerae normal  HENT: ear canals and TM's normal, nose and mouth without ulcers or lesions  NECK: no adenopathy, no asymmetry, masses, or scars and thyroid normal to palpation  RESP: lungs clear to auscultation - no rales, rhonchi or wheezes  BREAST: normal without masses, tenderness or nipple discharge and no palpable axillary masses or adenopathy  CV: regular rate and rhythm, normal S1 S2, no S3 or S4, no murmur, click or rub, no peripheral edema and peripheral pulses strong  ABDOMEN: soft, nontender, no hepatosplenomegaly, no masses and bowel sounds normal  MS: no gross " musculoskeletal defects noted, no edema  SKIN: no suspicious lesions or rashes  NEURO: Normal strength and tone, mentation intact and speech normal  PSYCH: mentation appears normal, affect normal/bright    Diagnostic Test Results:  Labs reviewed in Epic    ASSESSMENT/PLAN:   (Z00.00) Routine general medical examination at a health care facility  (primary encounter diagnosis)  Comment: encourage annual pe and vaccine up date.   Plan:     (K58.1) Irritable bowel syndrome with constipation  Comment: pt  has abd pain comes and goes with constipation and nausea. Recently she has poor appetite and lost 7 lb within 6 weeks. Denies blood in stool, vomiting and diarrhea. Denies fever. Exam is not remarkable. Reviewed lab at 10/2023 and normal. Likely IBS.   Plan: Adult GI  Referral - Consult Only        Advise push water and diet fiber to avoid constipation. Advise to take protein rich diet. Will have gi consult to rule out IBD.     (R10.2) Pelvic pain in female  Comment: pt has pelvic pain comes and goes. Nothing trigger it. She feels rectal spasm, bladder spasm. She is sex active and no pain at intercourse. No blood in stool and urine.   Plan: Physical Therapy Referral        We addressed pelvic floor  kegel exercise. Will have physical therapy.     Patient has been advised of split billing requirements and indicates understanding: Yes      COUNSELING:  Reviewed preventive health counseling, as reflected in patient instructions       Regular exercise       Healthy diet/nutrition       Vision screening       Immunizations  Vaccinated for: Pneumococcal and TDAP           Contraception       Safe sex practices/STD prevention        She reports that she is a non-smoker but has been exposed to tobacco smoke. She uses smokeless tobacco.          Shania Chery MD  [facility]  Answers submitted by the patient for this visit:  Patient Health Questionnaire (Submitted on 11/29/2023)  If you checked  off any problems, how difficult have these problems made it for you to do your work, take care of things at home, or get along with other people?: Somewhat difficult  PHQ9 TOTAL SCORE: 14  Immunization History   Administered Date(s) Administered    COVID-19 12+ (2023-24) (Pfizer) 10/13/2023    COVID-19 Bivalent 12+ (Pfizer) 03/12/2023    COVID-19 MONOVALENT 12+ (Pfizer) 12/05/2021    COVID-19 Vaccine (Joe) 04/08/2021    DTAP (<7y) 2003, 2003, 2003, 09/29/2004, 08/18/2008    Flu, Unspecified 03/12/2023    HIB (PRP-T) 2003, 2003, 2003, 09/29/2004    HPV 06/10/2015, 10/27/2015, 05/27/2016    HepB 2003, 2003, 2003    Influenza (H1N1) 12/29/2009, 02/23/2010    Influenza (IIV3) PF 11/10/2010, 11/15/2011, 10/16/2014    Influenza Vaccine >6 months,quad, PF 10/27/2015, 11/01/2017, 10/18/2018, 10/31/2021, 10/13/2023    Influenza, seasonal, injectable, PF 10/25/2008, 11/26/2008, 12/18/2009, 11/15/2011    Influenza,INJ,MDCK,PF,Quad >6mo(Flucelvax) 10/09/2020    MMR 09/29/2004, 08/18/2008    Meningococcal (Menomune ) 06/10/2015    OPV, trivalent, live 2003, 2003, 09/29/2004, 08/18/2008    Pneumo Conj 13-V (2010&after) 2003, 2003, 2003    Pneumococcal 20 valent Conjugate (Prevnar 20) 11/29/2023    TD,PF 7+ (Tenivac) 06/10/2015    TDAP (Adacel,Boostrix) 11/29/2023    Varicella 04/13/2004, 06/10/2015     Prior to immunization administration, verified patients identity using patient s name and date of birth. Please see Immunization Activity for additional information.     Screening Questionnaire for Adult Immunization    Are you sick today?   No   Do you have allergies to medications, food, a vaccine component or latex?   No   Have you ever had a serious reaction after receiving a vaccination?   No   Do you have a long-term health problem with heart, lung, kidney, or metabolic disease (e.g., diabetes), asthma, a blood disorder, no spleen,  complement component deficiency, a cochlear implant, or a spinal fluid leak?  Are you on long-term aspirin therapy?   No   Do you have cancer, leukemia, HIV/AIDS, or any other immune system problem?   No   Do you have a parent, brother, or sister with an immune system problem?   No   In the past 3 months, have you taken medications that affect  your immune system, such as prednisone, other steroids, or anticancer drugs; drugs for the treatment of rheumatoid arthritis, Crohn s disease, or psoriasis; or have you had radiation treatments?   No   Have you had a seizure, or a brain or other nervous system problem?   No   During the past year, have you received a transfusion of blood or blood    products, or been given immune (gamma) globulin or antiviral drug?   No   For women: Are you pregnant or is there a chance you could become       pregnant during the next month?   No   Have you received any vaccinations in the past 4 weeks?   No     Immunization questionnaire answers were all negative.      Patient instructed to remain in clinic for 15 minutes afterwards, and to report any adverse reactions.     Screening performed by Aurora Davsi MA on 11/29/2023 at 10:25 AM.

## 2023-11-29 NOTE — COMMUNITY RESOURCES LIST (ENGLISH)
11/29/2023   [facility]  N/A  For questions about this resource list or additional care needs, please contact your primary care clinic or care manager.  Phone: [phone]   Email: N/A   Address: [address] [place] [id number]   Hours: N/A        Food and Nutrition       Food pantry  1  [facility] Distance: 1.29 miles      Pickup   [address] [place] [address]  Language: English, English, Hmong, Macie, Bahamian  Hours: Mon 1:00 PM - 4:00 PM , Mon 5:00 PM - 6:30 PM , Tue - Fri 9:00 AM - 11:30 AM , Tue - Fri 1:00 PM - 3:30 PM  Fees: Free   Phone: (767) 783-6537 Website: https://[employer]/     2  [facility] [place] Distance: 2.15 miles      [address] [address]  Language: English, Hmong, Egyptian, Bahamian  Hours: Mon 11:00 AM - 3:00 PM , Wed 11:00 AM - 3:00 PM , Fri 11:00 AM - 3:00 PM  Fees: Free, Self Pay   Phone: (825) 305-7783 Email: [email or web address].org Website: http://[facility].org/[facility]/[address]/     SNAP application assistance  3  [facility] Out[facility] Distance: 1.29 miles      Phone/Virtual   [address] [place] [address]  Language: English, English, Hmong, Macie, Bahamian  Hours: Mon - Fri 10:00 AM - 12:00 PM , Mon - Fri 2:00 PM - 4:00 PM  Fees: Free   Phone: (602) 826-3973 Website: [email or web address]/     4  [facility] (SNAP) Distance: 2.79 miles      Phone/Virtual   [address] [address] [place] [address]  Language: English, Hmong, Rwandan, Saudi Arabian, Bahamian, Burmese  Hours: Mon - Fri 9:00 AM - 5:00 PM  Fees: Free   Phone: (977) 923-6359 Website: https://[place].gov/dhs/people-we-serve/adults/economic-assistance/food-nutrition/programs-and-services/supplemental-nutrition-assistance-program.jsp     Soup kitchen or free meals  [address]  [employer] [facility] [facility] [employer] Distance: 1.19 miles      Pickup   [address] [place] [address]  Language: English  Hours: Mon - Fri 5:00 PM - 6:00 PM  Fees: Free   Phone: (406) 330-8410 Email: mario@[email or web address] Campus[facility] Website: http://www.[facility].org/     6  [facility] Distance: 2.53 miles      Pickup   [address] [place] [address]  Language: English  Hours: Tue 2:00 PM - 4:00 PM , Thu 2:00 PM - 4:00 PM  Fees: Free   Phone: (775) 729-9866 Email: roseann@.[facility]. Website: https://www.[facility]/facilities/fickqy-qsmxmzclj-[email or web address]          Important Numbers & Websites       Emergency Services   911  [facility]   311  Poison Control   (631) 925-4183  Suicide Prevention Lifeline   (978) 793-2117 (TALK)  Child Abuse Hotline   (635) 552-8677 (4-A-Child)  Sexual Assault Hotline   (347) 698-4090 (HOPE)  National Runaway Safeline   (211) 122-2775 (RUNAWAY)  All-Options Talkline   (429) 229-3616  Substance Abuse Referral   (729) 433-1638 (HELP)

## 2023-12-20 ENCOUNTER — VIRTUAL VISIT (OUTPATIENT)
Dept: GASTROENTEROLOGY | Facility: CLINIC | Age: 18
End: 2023-12-20
Attending: FAMILY MEDICINE
Payer: COMMERCIAL

## 2023-12-20 VITALS — BODY MASS INDEX: 17.95 KG/M2 | HEIGHT: 65 IN

## 2023-12-20 DIAGNOSIS — M62.89 PELVIC FLOOR DYSFUNCTION IN FEMALE: ICD-10-CM

## 2023-12-20 DIAGNOSIS — K58.1 IRRITABLE BOWEL SYNDROME WITH CONSTIPATION: Primary | ICD-10-CM

## 2023-12-20 DIAGNOSIS — R11.0 NAUSEA: ICD-10-CM

## 2023-12-20 PROCEDURE — 99204 OFFICE O/P NEW MOD 45 MIN: CPT | Mod: VID | Performed by: INTERNAL MEDICINE

## 2023-12-20 RX ORDER — ONDANSETRON 4 MG/1
4 TABLET, ORALLY DISINTEGRATING ORAL EVERY 8 HOURS PRN
Qty: 30 TABLET | Refills: 1 | Status: SHIPPED | OUTPATIENT
Start: 2023-12-20 | End: 2024-04-07

## 2023-12-20 ASSESSMENT — PATIENT HEALTH QUESTIONNAIRE - PHQ9: SUM OF ALL RESPONSES TO PHQ QUESTIONS 1-9: 14

## 2023-12-20 ASSESSMENT — PAIN SCALES - GENERAL: PAINLEVEL: SEVERE PAIN (6)

## 2023-12-20 NOTE — PROGRESS NOTES
HPI:    Sera presents today for a video visit to discuss difficulty having bowel movements that have been going on since July. Her boyfriend is present as well.  Feels like her muscles tense up and she can't go.  Sometimes feels like she can only pass mucous or discharge. Over the last month has been getting nauseated every time she tries to have a bowel movement.  The last week has been throwing up.  Does feel like she needs to use her hands to help express stool.  Doesn't feel like she empties all the way.  Tried miralax which didn't seem to help - had the same issues but stools that she did pass were looser.   No prior surgeries.  No pregnancies. As a child did have issues with urination - now still struggles with urgency. No history of abuse.         Past Medical History:   Diagnosis Date    Depressive disorder 2020       No past surgical history on file.    Family History   Problem Relation Age of Onset    Diabetes Mother         type 1    Depression Mother     Coronary Artery Disease Mother     Cancer Maternal Grandmother     Breast Cancer Maternal Grandmother     Cerebrovascular Disease Paternal Grandmother     Anxiety Disorder Sister     Depression Sister        Social History     Tobacco Use    Smoking status: Never     Passive exposure: Yes    Smokeless tobacco: Current    Tobacco comments:     I would smoke a cig maybe once a day when I worked at a [employer]   Substance Use Topics    Alcohol use: No        O:    Gen: no acute distress  HEENT: NCAT  Neck: normal ROM  Resp: nonlabored breathing  Neuro: no gross deficits  Psych: appropriate mood and affect    Assessment and Plan:    # constipation - overall symptoms concerning for pelvic floor dysfunction.  Fortunately already has appointment with pelvic floor clinic.  For now - will start BID miralax.  Will also get AXR - if significant stool burden - may have to do bowel cleanse    # nausea - likely related to constipation - can use ondansetron  sparingly although did advise it may worsen constipation    RTC 6 months    Rosa Ronquillo, DO     Video-Visit Details     Type of service:  Video Visit     Video Start Time: 3:00 PM  Video End Time (time video stopped): 3:15 PM    Originating Location (pt. Location): home     Distant Location (provider location):  remote     Mode of Communication:  Video Conference via [employer]

## 2023-12-20 NOTE — PROGRESS NOTES
"Virtual Visit Details    Type of service:  Video Visit     Originating Location (pt. Location): {video visit patient location:885232::\"Home\"}  {PROVIDER LOCATION On-site should be selected for visits conducted from your clinic location or adjoining [facility] hospital, academic office, or other nearby [facility] building. Off-site should be selected for all other provider locations, including home:[id number]}  Distant Location (provider location):  {virtual location provider:242490}  Platform used for Video Visit: {Virtual Visit Platforms:347188::\"Matrix-Bio\"}  "

## 2023-12-20 NOTE — NURSING NOTE
Is the patient currently in the state of [place]? YES    Visit mode:VIDEO    If the visit is dropped, the patient can be reconnected by: VIDEO VISIT: Text to cell phone:   Telephone Information:   Mobile [phone]       Will anyone else be joining the visit? NO  (If patient encounters technical issues they should call [phone] :[id number])    How would you like to obtain your AVS? MyChart    Are changes needed to the allergy or medication list? Pt stated no changes to allergies and Pt stated no med changes    Reason for visit: Consult    Tila Wade VVF    Depression Response    Patient completed the PHQ-9 assessment for depression and scored >9? Yes  Question 9 on the PHQ-9 was positive for suicidality? No  Does patient have current mental health provider? Yes    Is this a virtual visit? Yes   Does patient have suicidal ideation (positive question 9)? No - offer to place Mental Health Referral.  Patient declined referral/not needed    I personally notified the following: visit provider

## 2023-12-20 NOTE — PATIENT INSTRUCTIONS
Please start taking miralax twice a day.  Please keep your appointment with pelvic floor clinic  Please have an abdominal x-ray done  You can use zofran as needed although be aware this can worsen constipation

## 2023-12-21 ENCOUNTER — ANCILLARY PROCEDURE (OUTPATIENT)
Dept: GENERAL RADIOLOGY | Facility: CLINIC | Age: 18
End: 2023-12-21
Attending: INTERNAL MEDICINE
Payer: COMMERCIAL

## 2023-12-21 ENCOUNTER — LAB (OUTPATIENT)
Dept: LAB | Facility: CLINIC | Age: 18
End: 2023-12-21
Payer: COMMERCIAL

## 2023-12-21 DIAGNOSIS — K58.1 IRRITABLE BOWEL SYNDROME WITH CONSTIPATION: ICD-10-CM

## 2023-12-21 DIAGNOSIS — F90.0 ATTENTION DEFICIT HYPERACTIVITY DISORDER, INATTENTIVE TYPE: ICD-10-CM

## 2023-12-21 DIAGNOSIS — M62.89 PELVIC FLOOR DYSFUNCTION IN FEMALE: ICD-10-CM

## 2023-12-21 DIAGNOSIS — R11.0 NAUSEA: ICD-10-CM

## 2023-12-21 PROCEDURE — 80053 COMPREHEN METABOLIC PANEL: CPT

## 2023-12-21 PROCEDURE — 36415 COLL VENOUS BLD VENIPUNCTURE: CPT

## 2023-12-21 PROCEDURE — 74019 RADEX ABDOMEN 2 VIEWS: CPT | Mod: TC | Performed by: RADIOLOGY

## 2023-12-22 LAB
ALBUMIN SERPL BCG-MCNC: 4.8 G/DL (ref 3.5–5.2)
ALP SERPL-CCNC: 80 U/L (ref 40–150)
ALT SERPL W P-5'-P-CCNC: 14 U/L (ref 0–50)
ANION GAP SERPL CALCULATED.3IONS-SCNC: 14 MMOL/L (ref 7–15)
AST SERPL W P-5'-P-CCNC: 22 U/L (ref 0–45)
BILIRUB SERPL-MCNC: 0.4 MG/DL
BUN SERPL-MCNC: 9.8 MG/DL (ref 6–20)
CALCIUM SERPL-MCNC: 9.9 MG/DL (ref 8.6–10)
CHLORIDE SERPL-SCNC: 103 MMOL/L (ref 98–107)
CREAT SERPL-MCNC: 0.7 MG/DL (ref 0.51–0.95)
DEPRECATED HCO3 PLAS-SCNC: 23 MMOL/L (ref 22–29)
EGFRCR SERPLBLD CKD-EPI 2021: >90 ML/MIN/1.73M2
GLUCOSE SERPL-MCNC: 98 MG/DL (ref 70–99)
POTASSIUM SERPL-SCNC: 4.4 MMOL/L (ref 3.4–5.3)
PROT SERPL-MCNC: 7.7 G/DL (ref 6.4–8.3)
SODIUM SERPL-SCNC: 140 MMOL/L (ref 135–145)

## 2024-01-29 ENCOUNTER — THERAPY VISIT (OUTPATIENT)
Dept: PHYSICAL THERAPY | Facility: CLINIC | Age: 19
End: 2024-01-29
Attending: FAMILY MEDICINE
Payer: COMMERCIAL

## 2024-01-29 DIAGNOSIS — R10.2 PELVIC PAIN IN FEMALE: ICD-10-CM

## 2024-01-29 DIAGNOSIS — N39.46 MIXED INCONTINENCE: ICD-10-CM

## 2024-01-29 DIAGNOSIS — K59.00 CONSTIPATION, UNSPECIFIED CONSTIPATION TYPE: Primary | ICD-10-CM

## 2024-01-29 PROCEDURE — 97162 PT EVAL MOD COMPLEX 30 MIN: CPT | Mod: GP | Performed by: PHYSICAL THERAPIST

## 2024-01-29 PROCEDURE — 97530 THERAPEUTIC ACTIVITIES: CPT | Mod: GP | Performed by: PHYSICAL THERAPIST

## 2024-01-29 PROCEDURE — 97110 THERAPEUTIC EXERCISES: CPT | Mod: GP | Performed by: PHYSICAL THERAPIST

## 2024-01-29 NOTE — PROGRESS NOTES
PHYSICAL THERAPY EVALUATION  Type of Visit: Evaluation    See electronic medical record for Abuse and Falls Screening details.    Subjective       Presenting condition or subjective complaint: I spoke to a GI docyor about my trouble pooping and anal/abdominal pain that started in July 2023! I talked to her for 10 minutes describing my symptoms and she said its probaly pelvic floor. My normal doc recommended as well. No apparent reason for change back in July.Also felt like straining/pushing just to try to get something moving. Also some discomfort after in rectal mm's/possible spasm/pressure/stretch out feeling. Tried Miralax for 1-2 weeks with little change. Also tried digital stimulation a bit. Every day feels like gets some stool out at [place] 1-4. May get slight mucous discharge occasionally.   Date of onset:      Relevant medical history: Bladder or bowel problems; Depression; Mental Illness   Dates & types of surgery:      Prior diagnostic imaging/testing results: X-ray     Prior therapy history for the same diagnosis, illness or injury: No      Living Environment  Social support: With a significant other or spouse   Type of home: House   Stairs to enter the home: Yes 12 Is there a railing: Yes   Ramp: No   Stairs inside the home: No       Help at home: Medication and/or finances  Equipment owned:       Employment: Yes   Hobbies/Interests: crafts, time with family and friends, music    Patient goals for therapy: Pooping/no pooping can be so so painful, I want my muscles to be able to relax furing and after because the residual pain can ruin my night. I want to be pess anxious and grossed out about the bathroom. No leakage, no pain.      Objective      PELVIC EVALUATION  ADDITIONAL HISTORY:  Sex assigned at birth: Female  Gender identity: Female    Pronouns: She/Her Hers      Bladder History:  Feels bladder filling: No  Triggers for feeling of inability to wait to go to the bathroom: Yes getting up,  getting home, walking up stairs  How long can you wait to urinate: maybe a minute but- usually I have to physically hold my hand there to keep it in.  Gets up at night to urinate: Yes 2 or 3  Can stop the flow of urine when urinating: Sometimes  Volume of urine usually released: Medium . Not sure if emptying.   Other issues: Slow or hesitant urine stream; Trouble emptying bladder completely; Dribbling after urinating  Number of bladder infections in last 12 months:    Fluid intake per day: 32      Medications taken for bladder: No     Activities causing urine leak: Run; Hurrying to the bathroom due to a strong urge to urinate (pee)    Amount of urine typically leaked: sometimes the puddle on my underwear is just larger than a quarter, but other times a significant amount will come out, enough to wet through my pants.  Voiding more often at home (not sure timeline).   Pads used to help with leaking: No    Uses pantiliners sometimes. Occasionally has to change underwears. Most nights will dribble.    Bowel History:  Frequency of bowel movement:    Consistency of stool: Other There are hard pellets, as well all softer strands within one sitting. sometimes textures are combined in one piece as well.  Ignores the urge to defecate: No  Other bowel issues: Pain when pooping; Straining to have bowel movement  Length of time spent trying to have a bowel movement: atleast 15 minutes    Sexual Function History:  Sexual orientation: Straight    Sexually active: Yes  Lubrication used: No No  Pelvic pain: Walking; Deep penetration (rectal or vaginal); Use of tampon, feels uncomfortable when in.      Pain or difficulty with orgasms/erection/ejaculation: Yes The orgasm feels great but sometimes after I finish my deeper vaginal/abdonminal muscles will slighty burn/cramp- orgasm can also help my muscles relax so its confusing.   State of menopause: Perimenopause (have not gone through menopause yet)  Hormone medications: No      Are  you currently pregnant: No, Have you been diagnosed with pelvic prolapse or abdominal separation: No, Do you get regular exercise: Yes, I do this type of exercise: ice skating right now(rollerblading, skateboarding), dancing and running snf picking up kids, Have you tried pelvic floor strengthening exercises for 4 weeks: No, Do you have any history of trauma that is relevant to your care that you d like to share: Yes, I d like to discuss it with my provider in person.    Discussed reason for referral regarding pelvic health needs and external/internal pelvic floor muscle examination with patient/guardian.  Opportunity provided to ask questions and verbal consent for assessment and intervention was given.    BREATHING SYMMETRY: WNL    PELVIC EXAM  External Visual Inspection:  At rest: Normal    External Digital Palpation per Perineum:   Ischiocavernosis: Unremarkable  Bulbo cavernosis: Tightness  Transverse perineal: Unremarkable  Levator ani: Tightness  Perineal body: Unremarkable      Internal Digital Palpation:  Per Vagina:  Myofascial Resistance to Palpation: Firm, Taut, B LA/OI tension but minimal tenderness  Digital Muscle Performance: P (Power): Kegel 2+, slight delayed relaxation  E (Endurance): 5 seconds, slight delayed relaxation          ABDOMINAL ASSESSMENT  Diastasis Rectus Abdominis (NANCY):  NANCY presence: No  TTP along ascending colon  Abdominal Activation/Strength:  Fair TA set with exhale      Assessment & Plan   CLINICAL IMPRESSIONS  Medical Diagnosis: pelvic pain    Treatment Diagnosis: pelvic pain   Impression/Assessment: Patient is a 18 year old female with pelvic floor complaints.  The following significant findings have been identified: Pain, Decreased ROM/flexibility, Decreased strength, Decreased proprioception, Inflammation, Impaired muscle performance, and Decreased activity tolerance. These impairments interfere with their ability to perform self care tasks, work tasks, recreational  activities, household chores, household mobility, and community mobility as compared to previous level of function.     Clinical Decision Making (Complexity):  Clinical Presentation: Evolving/Changing  Clinical Presentation Rationale: based on medical and personal factors listed in PT evaluation  Clinical Decision Making (Complexity): Moderate complexity    PLAN OF CARE  Treatment Interventions:  Interventions: Manual Therapy, Neuromuscular Re-education, Therapeutic Activity, Therapeutic Exercise, Self-Care/Home Management    Long Term Goals     PT Goal 1  Goal Identifier: Kegel strength 4  Goal Description: for continence throughout the day/night for healthy hygeine      Frequency of Treatment:    Duration of Treatment:      Recommended Referrals to Other Professionals: Physical Therapy  Education Assessment:   Learner/Method: Patient;No Barriers to Learning    Risks and benefits of evaluation/treatment have been explained.   Patient/Family/caregiver agrees with Plan of Care.     Evaluation Time:             Signing Clinician: Kristin Castro, PT, OCS

## 2024-01-30 PROBLEM — R10.2 PELVIC PAIN IN FEMALE: Status: ACTIVE | Noted: 2024-01-30

## 2024-01-30 PROBLEM — K59.00 CONSTIPATION, UNSPECIFIED CONSTIPATION TYPE: Status: ACTIVE | Noted: 2024-01-30

## 2024-01-30 PROBLEM — N39.46 MIXED INCONTINENCE: Status: ACTIVE | Noted: 2024-01-30

## 2024-02-05 ENCOUNTER — THERAPY VISIT (OUTPATIENT)
Dept: PHYSICAL THERAPY | Facility: CLINIC | Age: 19
End: 2024-02-05
Attending: FAMILY MEDICINE
Payer: COMMERCIAL

## 2024-02-05 DIAGNOSIS — N39.46 MIXED INCONTINENCE: ICD-10-CM

## 2024-02-05 DIAGNOSIS — R10.2 PELVIC PAIN IN FEMALE: Primary | ICD-10-CM

## 2024-02-05 DIAGNOSIS — K59.00 CONSTIPATION, UNSPECIFIED CONSTIPATION TYPE: ICD-10-CM

## 2024-02-05 PROCEDURE — 97530 THERAPEUTIC ACTIVITIES: CPT | Mod: GP

## 2024-02-05 PROCEDURE — 97140 MANUAL THERAPY 1/> REGIONS: CPT | Mod: GP

## 2024-02-12 ENCOUNTER — THERAPY VISIT (OUTPATIENT)
Dept: PHYSICAL THERAPY | Facility: CLINIC | Age: 19
End: 2024-02-12
Attending: FAMILY MEDICINE
Payer: COMMERCIAL

## 2024-02-12 DIAGNOSIS — R10.2 PELVIC PAIN IN FEMALE: Primary | ICD-10-CM

## 2024-02-12 DIAGNOSIS — K59.00 CONSTIPATION, UNSPECIFIED CONSTIPATION TYPE: ICD-10-CM

## 2024-02-12 DIAGNOSIS — N39.46 MIXED INCONTINENCE: ICD-10-CM

## 2024-02-12 PROCEDURE — 97530 THERAPEUTIC ACTIVITIES: CPT | Mod: GP

## 2024-02-12 PROCEDURE — 97140 MANUAL THERAPY 1/> REGIONS: CPT | Mod: GP

## 2024-02-27 ENCOUNTER — THERAPY VISIT (OUTPATIENT)
Dept: PHYSICAL THERAPY | Facility: CLINIC | Age: 19
End: 2024-02-27
Payer: COMMERCIAL

## 2024-02-27 DIAGNOSIS — R10.2 PELVIC PAIN IN FEMALE: Primary | ICD-10-CM

## 2024-02-27 DIAGNOSIS — K59.00 CONSTIPATION, UNSPECIFIED CONSTIPATION TYPE: ICD-10-CM

## 2024-02-27 DIAGNOSIS — N39.46 MIXED INCONTINENCE: ICD-10-CM

## 2024-02-27 PROCEDURE — 97530 THERAPEUTIC ACTIVITIES: CPT | Mod: GP

## 2024-02-27 PROCEDURE — 97140 MANUAL THERAPY 1/> REGIONS: CPT | Mod: GP

## 2024-03-11 ENCOUNTER — THERAPY VISIT (OUTPATIENT)
Dept: PHYSICAL THERAPY | Facility: CLINIC | Age: 19
End: 2024-03-11
Payer: COMMERCIAL

## 2024-03-11 DIAGNOSIS — R10.2 PELVIC PAIN IN FEMALE: Primary | ICD-10-CM

## 2024-03-11 DIAGNOSIS — N39.46 MIXED INCONTINENCE: ICD-10-CM

## 2024-03-11 DIAGNOSIS — K59.00 CONSTIPATION, UNSPECIFIED CONSTIPATION TYPE: ICD-10-CM

## 2024-03-11 PROCEDURE — 97140 MANUAL THERAPY 1/> REGIONS: CPT | Mod: GP

## 2024-03-11 PROCEDURE — 97530 THERAPEUTIC ACTIVITIES: CPT | Mod: GP

## 2024-03-26 ENCOUNTER — THERAPY VISIT (OUTPATIENT)
Dept: PHYSICAL THERAPY | Facility: CLINIC | Age: 19
End: 2024-03-26
Payer: COMMERCIAL

## 2024-03-26 DIAGNOSIS — R10.2 PELVIC PAIN IN FEMALE: Primary | ICD-10-CM

## 2024-03-26 DIAGNOSIS — K59.00 CONSTIPATION, UNSPECIFIED CONSTIPATION TYPE: ICD-10-CM

## 2024-03-26 DIAGNOSIS — N39.46 MIXED INCONTINENCE: ICD-10-CM

## 2024-03-26 PROCEDURE — 97140 MANUAL THERAPY 1/> REGIONS: CPT | Mod: GP

## 2024-03-26 PROCEDURE — 97110 THERAPEUTIC EXERCISES: CPT | Mod: GP

## 2024-03-26 PROCEDURE — 97530 THERAPEUTIC ACTIVITIES: CPT | Mod: GP

## 2024-03-26 NOTE — PROGRESS NOTES
"   03/26/24 0500   Appointment Info   Signing clinician's name / credentials Joyce Phillips, PT, OCS/ANAHY Li   Total/Authorized Visits epic orders 11/29/23   Visits Used 6   Medical Diagnosis pelvic pain   PT Tx Diagnosis constipation/pelvic pain/mixed incontinence   Precautions/Limitations Discussed with patient/guardian reason for referral regarding pelvic health needs and external/internal pelvic floor muscle examination.  Opportunity provided to ask questions and verbal consent for assessment and intervention was given.   Other pertinent information Goes by \"Radha\"   Quick Adds Pelvic Consent   Progress Note/Certification   Progress Note Due Date 05/24/24   Progress Note Completed Date 03/26/24   GOALS   PT Goals 2   PT Goal 1   Goal Identifier Kegel strength 4   Goal Description for continence throughout the day/night for healthy hygeine   Rationale to maximize safety and independence with performance of ADLs and functional tasks;to maximize safety and independence within the home;to maximize safety and independence within the community;to maximize safety and independence with transportation;to maximize safety and independence with self cares   Goal Progress 3/26/24: current 3/5, good relaxation post-contraction   Target Date 04/23/24   PT Goal 2   Goal Identifier Able to have full evacuation and painfree BM's   Goal Description 3-4 x week   Rationale to maximize safety and independence with performance of ADLs and functional tasks;to maximize safety and independence within the home;to maximize safety and independence within the community;to maximize safety and independence with transportation;to maximize safety and independence with self cares   Goal Progress current less pain than eval (0-2/10 currently), continues to needs meds, less need for digital stimulation   Target Date 04/23/24   Subjective Report   Subjective Report Radha reports improvement in stool consistency that " coincides w/ increasing intake of whole foods focused on insoluble fiber. She continues to utilize digital help to initiative BM ~80% of the time (d/t rectal mm feeling tense). Sometimes BM now feels like a more complete emptying. She feels most superficital muscles around anus can relax, but muscles w/in the rectum does not relax as needed. She has stopped drinking sodas and noticed that helped w/ urinary urgency, experiencing less since last visit. She also notes gaining around 4# over past week & is really excited about it as she previously experienced unwanted weightloss d/t severe constipation & reduction in appetite.   Objective Measures   Objective Measures Objective Measure 1;Objective Measure 2   Objective Measure 1   Objective Measure PFM Assessment & Palpation   Details PFM strength improving: 3/5 power today, good relaxation post-contraction; reduced hypertonicity globally upon intravaginal assessment w/ mild TTP R OI only; able to bear down well   Objective Measure 2   Objective Measure Bladder & Bowel Diary   Details Key Treds: 1) increase whole grains, fruit, & veggie; 2) decrease soda intake; 3) urinary urgency most pronounced upon transferring STS & arriving home   Treatment Interventions (PT)   Interventions Therapeutic Activity;Manual Therapy   Therapeutic Procedure/Exercise   Therapeutic Procedures: strength, endurance, ROM, flexibility minutes (93601) 10   PTRx Ther Proc 1 Adductor Stretch   PTRx Ther Proc 1 - Details 1x1-2, hold at least 30 sec daily   PTRx Ther Proc 2 Happy Baby   PTRx Ther Proc 2 - Details 1x1-2, hold at least 30 sec daily   PTRx Ther Proc 3 Pelvic Floor Bulge Position   PTRx Ther Proc 3 - Details 1x1-2 hold for at least 30sec   Therapeutic Activity   Therapeutic Activities: dynamic activities to improve functional performance minutes (04123) 10   Therapeutic Activities Ther Act 2;Ther Act 3   Ther Act 1 educated in POC and normal pelvic floor anatomy/function   Ther Act 1 -  Details General Bladder Information   Ther Act 2 Urge Incontinence Suppression Techniques   Ther Act 2 - Details stop, breathe, be the boss, distract   Ther Act 3 Role of Fiber in Constipation Management   Ther Act 3 - Details Pt educated on role of insoluble vs solulble fiber for constipation management   PTRx Ther Act 1 Bladder Diary   PTRx Ther Act 1 - Details Reviewed today w/ key takeaways: increase whole foods intake (80-20% rule) & decrease soda intake   PTRx Ther Act 2 Diaphragmatic Breathing   PTRx Ther Act 2 - Details 5-10 min daily  (added visualization of rectum relaxing & passing stool successfully at end of breathing to support NM connection)   PTRx Ther Act 3 Toileting Position   PTRx Ther Act 3 - Details as needed during BM; further reviewed today w/ focus on gently bearing & breathing during BM   Skilled Intervention to improve PF relaxaton   Patient Response/Progress good demonstration/understanding   Manual Therapy   Manual Therapy: Mobilization, MFR, MLD, friction massage minutes (76913) 15   Manual Therapy 1 STM of PFM externally focused on perineal & rectal region   Manual Therapy 1 - Details Particularly focused on B bulbosponginosous, coccygeus in sidelying position   Skilled Intervention STM performed to reduce PFM muscle tension in order to best maximize musclature for continence & pain free vaginal penetration   Patient Response/Progress Pt noted mild discomfort, no pain during manual therapy today.   Manual Therapy 2 STM w/ focus on B OIs via friction massage   Manual Therapy 2 - Details minimal TTP B OI, reduced hypertonicity noted throughout PFM intravaginally   Manual Therapy Manual Therapy 2   Education   Learner/Method Patient;No Barriers to Learning   Education Comments Pt educated on   Plan   Home program PTRX HEP   Plan for next session Internal rectal assessment & quick flicks (another tool for urgency). (she said she can tell hard relaxing deeper mm around anus for BM)    Comments   Pelvic Health Informed Consent Statement Discussed with patient/guardian reason for referral regarding pelvic health needs and external/internal pelvic floor muscle examination.  Opportunity provided to ask questions and verbal consent for assessment and intervention was given.   Total Session Time   Timed Code Treatment Minutes 35   Total Treatment Time (sum of timed and untimed services) 35       PLAN  Continue therapy per current plan of care.    Beginning/End Dates of Progress Note Reporting Period:  03/26/24 to 03/26/2024    Referring Provider:  Shania Chery

## 2024-04-07 ENCOUNTER — HOSPITAL ENCOUNTER (EMERGENCY)
Facility: HOSPITAL | Age: 19
Discharge: HOME | End: 2024-04-07
Attending: EMERGENCY MEDICINE | Admitting: EMERGENCY MEDICINE
Payer: COMMERCIAL

## 2024-04-07 VITALS
TEMPERATURE: 98 F | SYSTOLIC BLOOD PRESSURE: 106 MMHG | OXYGEN SATURATION: 96 % | RESPIRATION RATE: 16 BRPM | HEART RATE: 74 BPM | DIASTOLIC BLOOD PRESSURE: 58 MMHG

## 2024-04-07 DIAGNOSIS — R11.10 VOMITING: ICD-10-CM

## 2024-04-07 DIAGNOSIS — R11.0 NAUSEA: ICD-10-CM

## 2024-04-07 DIAGNOSIS — R19.7 DIARRHEA: ICD-10-CM

## 2024-04-07 DIAGNOSIS — M62.89 PELVIC FLOOR DYSFUNCTION IN FEMALE: ICD-10-CM

## 2024-04-07 DIAGNOSIS — K58.1 IRRITABLE BOWEL SYNDROME WITH CONSTIPATION: ICD-10-CM

## 2024-04-07 LAB
ALBUMIN SERPL BCG-MCNC: 4.8 G/DL (ref 3.5–5.2)
ALP SERPL-CCNC: 79 U/L (ref 40–150)
ALT SERPL W P-5'-P-CCNC: 60 U/L (ref 0–50)
ANION GAP SERPL CALCULATED.3IONS-SCNC: 17 MMOL/L (ref 7–15)
AST SERPL W P-5'-P-CCNC: 53 U/L (ref 0–45)
BASOPHILS # BLD AUTO: 0.1 10E3/UL (ref 0–0.2)
BASOPHILS NFR BLD AUTO: 0 %
BILIRUB SERPL-MCNC: 0.2 MG/DL
BUN SERPL-MCNC: 22.2 MG/DL (ref 6–20)
CALCIUM SERPL-MCNC: 9.8 MG/DL (ref 8.6–10)
CHLORIDE SERPL-SCNC: 95 MMOL/L (ref 98–107)
CREAT SERPL-MCNC: 0.72 MG/DL (ref 0.51–0.95)
DEPRECATED HCO3 PLAS-SCNC: 23 MMOL/L (ref 22–29)
EGFRCR SERPLBLD CKD-EPI 2021: >90 ML/MIN/1.73M2
EOSINOPHIL # BLD AUTO: 0 10E3/UL (ref 0–0.7)
EOSINOPHIL NFR BLD AUTO: 0 %
ERYTHROCYTE [DISTWIDTH] IN BLOOD BY AUTOMATED COUNT: 13.6 % (ref 10–15)
GLUCOSE SERPL-MCNC: 137 MG/DL (ref 70–99)
HCT VFR BLD AUTO: 43.2 % (ref 35–47)
HGB BLD-MCNC: 14.6 G/DL (ref 11.7–15.7)
IMM GRANULOCYTES # BLD: 0.1 10E3/UL
IMM GRANULOCYTES NFR BLD: 1 %
LIPASE SERPL-CCNC: 113 U/L (ref 13–60)
LYMPHOCYTES # BLD AUTO: 0.8 10E3/UL (ref 0.8–5.3)
LYMPHOCYTES NFR BLD AUTO: 5 %
MCH RBC QN AUTO: 30.6 PG (ref 26.5–33)
MCHC RBC AUTO-ENTMCNC: 33.8 G/DL (ref 31.5–36.5)
MCV RBC AUTO: 91 FL (ref 78–100)
MONOCYTES # BLD AUTO: 1.3 10E3/UL (ref 0–1.3)
MONOCYTES NFR BLD AUTO: 8 %
NEUTROPHILS # BLD AUTO: 13.9 10E3/UL (ref 1.6–8.3)
NEUTROPHILS NFR BLD AUTO: 86 %
NRBC # BLD AUTO: 0 10E3/UL
NRBC BLD AUTO-RTO: 0 /100
PLATELET # BLD AUTO: 290 10E3/UL (ref 150–450)
POTASSIUM SERPL-SCNC: 3.6 MMOL/L (ref 3.4–5.3)
PROT SERPL-MCNC: 8.3 G/DL (ref 6.4–8.3)
RBC # BLD AUTO: 4.77 10E6/UL (ref 3.8–5.2)
SODIUM SERPL-SCNC: 135 MMOL/L (ref 135–145)
WBC # BLD AUTO: 16 10E3/UL (ref 4–11)

## 2024-04-07 PROCEDURE — 250N000011 HC RX IP 250 OP 636

## 2024-04-07 PROCEDURE — 80053 COMPREHEN METABOLIC PANEL: CPT

## 2024-04-07 PROCEDURE — 96374 THER/PROPH/DIAG INJ IV PUSH: CPT

## 2024-04-07 PROCEDURE — 258N000003 HC RX IP 258 OP 636

## 2024-04-07 PROCEDURE — 96361 HYDRATE IV INFUSION ADD-ON: CPT

## 2024-04-07 PROCEDURE — 36415 COLL VENOUS BLD VENIPUNCTURE: CPT

## 2024-04-07 PROCEDURE — 99284 EMERGENCY DEPT VISIT MOD MDM: CPT | Mod: 25

## 2024-04-07 PROCEDURE — 85025 COMPLETE CBC W/AUTO DIFF WBC: CPT

## 2024-04-07 PROCEDURE — 83690 ASSAY OF LIPASE: CPT

## 2024-04-07 RX ORDER — ONDANSETRON 4 MG/1
4 TABLET, ORALLY DISINTEGRATING ORAL EVERY 8 HOURS PRN
Qty: 12 TABLET | Refills: 0 | Status: SHIPPED | OUTPATIENT
Start: 2024-04-07 | End: 2024-04-11

## 2024-04-07 RX ORDER — ONDANSETRON 2 MG/ML
4 INJECTION INTRAMUSCULAR; INTRAVENOUS EVERY 30 MIN PRN
Status: DISCONTINUED | OUTPATIENT
Start: 2024-04-07 | End: 2024-04-07 | Stop reason: HOSPADM

## 2024-04-07 RX ADMIN — SODIUM CHLORIDE 1000 ML: 9 INJECTION, SOLUTION INTRAVENOUS at 19:12

## 2024-04-07 RX ADMIN — ONDANSETRON 4 MG: 2 INJECTION INTRAMUSCULAR; INTRAVENOUS at 19:08

## 2024-04-07 ASSESSMENT — ACTIVITIES OF DAILY LIVING (ADL)
ADLS_ACUITY_SCORE: 35
ADLS_ACUITY_SCORE: 33

## 2024-04-07 ASSESSMENT — COLUMBIA-SUICIDE SEVERITY RATING SCALE - C-SSRS
6. HAVE YOU EVER DONE ANYTHING, STARTED TO DO ANYTHING, OR PREPARED TO DO ANYTHING TO END YOUR LIFE?: NO
2. HAVE YOU ACTUALLY HAD ANY THOUGHTS OF KILLING YOURSELF IN THE PAST MONTH?: NO
1. IN THE PAST MONTH, HAVE YOU WISHED YOU WERE DEAD OR WISHED YOU COULD GO TO SLEEP AND NOT WAKE UP?: NO

## 2024-04-07 NOTE — ED TRIAGE NOTES
The patient has had n/v/d for 2.5 days. Denies blood in the vomit. Actively vomiting in triage.

## 2024-04-07 NOTE — ED PROVIDER NOTES
Emergency Department Encounter   NAME: Sera Card  AGE: 19 year old female  YOB: 2005  MRN: [id number]    PCP: No Ref-Primary, Physician  ED PROVIDER: Ashley Jones PA-C    Evaluation Date & Time:   4/7/2024  6:38 PM    CHIEF COMPLAINT:  Nausea, Vomiting, & Diarrhea      Impression and Plan   MDM: 20 yo F with a history of pelvic pain, depression, and anxiety presents for evaluation of nausea, vomiting, and diarrhea. Vomiting woke her up on Friday night and she has been nauseated and vomiting since then. She has had a couple loose stools since then as well. Generalized abdominal pain. Possibly ate spoiled soup on Thursday and Friday. She does use marijuana, but not since she started vomiting. On arrival patient is hypertensive, but of note she was vomiting in triage while vitals were being taken. Otherwise vitally stable, afebrile. On exam patient is retching. Abdomen non-tender and without peritoneal signs - low clinical suspicion for cholecystitis, appendicitis.    ED Course as of 04/07/24 2010   Sun Apr 07, 2024   1842 Met with patient for initial interview and exam.   1930 Lipase(!): 113  Elevated, but not diagnostic for pancreatitis. No epigastric tenderness to palpation.    1936 CBC with platelets differential(!)  Leukocytosis to 16. No fever, chills concerning for systemic infection. Likely reactive to days of vomiting.    1937 Reassessed patient. She is feeling much better after some fluids and Zofran.    2006 Labs with slightly elevated LFTs and lipase. Could be suggestive of cholecystitis, but on exam absolutely no right upper quadrant tenderness. I have low suspicion that this is cholecystitis. Discussed this with the patient and she is comfortable with outpatient management and strict return precautions. Encouraged follow up with primary to re-check abnormal labs. Patient is agreeable to this. Discharged home with Zofran in stable condition with strict return precautions.         Medical Decision Making  Obtained supplemental history:Supplemental history obtained?: Family Member/Significant Other  Reviewed external records: External records reviewed?: Outpatient Record: 03/26/2024 [facility]. Patient seen for pelvic pain therapy.  Care impacted by chronic illness:Mental Health  Care significantly affected by social determinants of health:Access to Medical Care  Did you consider but not order tests?: Work up considered but not performed and documented in chart, if applicable  Did you interpret images independently?: Independent interpretation of ECG and images noted in documentation, when applicable.  Consultation discussion with other provider:Did you involve another provider (consultant, , pharmacy, etc.)?: I discussed the care with another health care provider, see documentation for details.  Discharge. I prescribed additional prescription strength medication(s) as charted. N/A.   At the conclusion of the encounter I discussed the results of all the tests and the disposition. The questions were answered. The patient or family acknowledged understanding and was agreeable with the care plan.    FINAL IMPRESSION:    ICD-10-CM    1. Vomiting  R11.10       2. Diarrhea  R19.7       3. Irritable bowel syndrome with constipation  K58.1 ondansetron (ZOFRAN ODT) 4 MG ODT tab      4. Pelvic floor dysfunction in female  M62.89 ondansetron (ZOFRAN ODT) 4 MG ODT tab      5. Nausea  R11.0 ondansetron (ZOFRAN ODT) 4 MG ODT tab            MEDICATIONS GIVEN IN THE EMERGENCY DEPARTMENT:  Medications   sodium chloride 0.9% BOLUS 1,000 mL (1,000 mLs Intravenous $New Bag 4/7/24 1912)   ondansetron (ZOFRAN) injection 4 mg (4 mg Intravenous $Given 4/7/24 1908)         NEW PRESCRIPTIONS STARTED AT TODAY'S ED VISIT:  Current Discharge Medication List            HPI   Patient information was obtained from: Patient and partner   Use of Intrepreter: N/A    Sera Card is a  19 year old female with a pertinent history of pelvic pain, depression, and anxiety who presents to the ED by walk in for evaluation of nausea, vomiting, and diarrhea. Patient woke up with vomiting on Friday evening. She has continued vomiting since then. Reports mild generalized abdominal pain. She notes some watery stools. Patient notes she ate some old soup on Thursday and Friday that might have been bad. No raw or undercooked meat. She does use marijuana, but reports she hasn't smoked since she started vomiting. No hematemesis, melena, urinary symptoms, fever, chills. Still has her gallbladder and appendix. LMP ended a few days ago. Denies possibility of pregnancy.       REVIEW OF SYSTEMS:  Pertinent positive and negative symptoms per HPI.       Medical History     Past Medical History:   Diagnosis Date    Depressive disorder 2020       No past surgical history on file.    Family History   Problem Relation Age of Onset    Diabetes Mother         type 1    Depression Mother     Coronary Artery Disease Mother     Cancer Maternal Grandmother     Breast Cancer Maternal Grandmother     Cerebrovascular Disease Paternal Grandmother     Anxiety Disorder Sister     Depression Sister        Social History     Tobacco Use    Smoking status: Never     Passive exposure: Yes    Smokeless tobacco: Current    Tobacco comments:     I would smoke a cig maybe once a day when I worked at a [employer]   Vaping Use    Vaping Use: Never used   Substance Use Topics    Alcohol use: No    Drug use: Yes     Types: Marijuana     Comment: helps my anxiety/stomach       ondansetron (ZOFRAN ODT) 4 MG ODT tab  atomoxetine (STRATTERA) 40 MG capsule  hydrOXYzine (ATARAX) 50 MG tablet  sertraline (ZOLOFT) 50 MG tablet  traZODone (DESYREL) 50 MG tablet          Physical Exam     First Vitals:  Patient Vitals for the past 24 hrs:   BP Temp Pulse Resp SpO2   04/07/24 1945 122/58 -- 80 -- 100 %   04/07/24 1835 (!) 169/69 98  F (36.7  C) 77 16 97 %        PHYSICAL EXAM:   General Appearance:  Alert, cooperative, no distress, appears stated age  HENT: Normocephalic without obvious deformity, atraumatic. Mucous membranes moist   Eyes: Conjunctiva clear, Lids normal. No discharge.   Respiratory: No distress. Lungs clear to ausculation bilaterally. No wheezes, rhonchi or stridor  Cardiovascular: Regular rate and rhythm, no murmur. Normal cap refill. No peripheral edema  GI: Abdomen soft, non-tender to palpation, normal bowel sounds. Negative clark sign. No rigidity or guarding.   : No CVA tenderness  Musculoskeletal: Moving all extremities. No gross deformities  Integument: Warm, dry, no rashes or lesions  Neurologic: Alert and orientated x3. No focal deficits.  Psych: Normal mood and affect      Results     LAB:  All pertinent labs reviewed and interpreted  Labs Ordered and Resulted from Time of ED Arrival to Time of ED Departure   COMPREHENSIVE METABOLIC PANEL - Abnormal       Result Value    Sodium 135      Potassium 3.6      Carbon Dioxide (CO2) 23      Anion Gap 17 (*)     Urea Nitrogen 22.2 (*)     Creatinine 0.72      GFR Estimate >90      Calcium 9.8      Chloride 95 (*)     Glucose 137 (*)     Alkaline Phosphatase 79      AST 53 (*)     ALT 60 (*)     Protein Total 8.3      Albumin 4.8      Bilirubin Total 0.2     LIPASE - Abnormal    Lipase 113 (*)    CBC WITH PLATELETS AND DIFFERENTIAL - Abnormal    WBC Count 16.0 (*)     RBC Count 4.77      Hemoglobin 14.6      Hematocrit 43.2      MCV 91      MCH 30.6      MCHC 33.8      RDW 13.6      Platelet Count 290      % Neutrophils 86      % Lymphocytes 5      % Monocytes 8      % Eosinophils 0      % Basophils 0      % Immature Granulocytes 1      NRBCs per 100 WBC 0      Absolute Neutrophils 13.9 (*)     Absolute Lymphocytes 0.8      Absolute Monocytes 1.3      Absolute Eosinophils 0.0      Absolute Basophils 0.1      Absolute Immature Granulocytes 0.1      Absolute NRBCs 0.0         RADIOLOGY:  No orders  to display       Ashley Jones PA-C   Emergency Medicine   [facility] EMERGENCY DEPARTMENT       Ashley Jones PA-C  04/07/24 2011

## 2024-04-08 NOTE — ED PROVIDER NOTES
Emergency Department Staff Note  I had a face to face encounter with this patient seen by the Advanced Practice Provider (WILL).  I personally made/approved the management plan and take responsibility for the patient management. I personally saw the patient and performed a substantive portion of the visit including all aspects of the medical decision making.    I saw this patient with Ashley Jones PA-C.    Patient is a pleasant 19-year-old female who comes in today with nausea and vomiting and diarrhea that started on Friday.  She reports that she was not keeping things down today.  She uses some marijuana but has not for a while.  Her exam is pretty benign.  She is no tenderness or rebound or guarding on my exam.  She looks well.  She initially had felt better a little bit earlier and then symptoms got worse again.  Her lipase was little bit elevated and her liver enzymes were slightly elevated but with her abdominal exam having no tenderness I do not think we need to do further workup on her.  I pushed on her belly 1 last time and she has no tenderness in the right upper quadrant or epigastric area.  I talked to her about her liver enzymes and lipase being slightly elevated.  She is feeling much better now and I think she can continue to do supportive care at home.  Will send her with a prescription for Zofran.  I told her to get repeat labs in the next week to make sure things are getting better and we talked about reasons to return.  She was comfortable with the plan.    [facility] EMERGENCY DEPARTMENT  MD Sola Yates, Cassie Devlin MD  04/07/24 2006

## 2024-04-08 NOTE — DISCHARGE INSTRUCTIONS
You were seen today for vomiting and diarrhea.     A few of your lab results were slightly abnormal, likely due to the vomiting for the last few days. But it's important that you schedule a follow up with your primary doctor in the next week to re-check these.

## 2024-04-11 ENCOUNTER — OFFICE VISIT (OUTPATIENT)
Dept: FAMILY MEDICINE | Facility: CLINIC | Age: 19
End: 2024-04-11
Payer: COMMERCIAL

## 2024-04-11 VITALS
SYSTOLIC BLOOD PRESSURE: 114 MMHG | DIASTOLIC BLOOD PRESSURE: 79 MMHG | HEART RATE: 81 BPM | OXYGEN SATURATION: 100 % | TEMPERATURE: 97.6 F

## 2024-04-11 DIAGNOSIS — R19.7 DIARRHEA, UNSPECIFIED TYPE: ICD-10-CM

## 2024-04-11 DIAGNOSIS — R10.9 STOMACH PAIN: Primary | ICD-10-CM

## 2024-04-11 DIAGNOSIS — K29.70 GASTRITIS WITHOUT BLEEDING, UNSPECIFIED CHRONICITY, UNSPECIFIED GASTRITIS TYPE: ICD-10-CM

## 2024-04-11 LAB
ACANTHOCYTES BLD QL SMEAR: NORMAL
ALBUMIN SERPL BCG-MCNC: 4.7 G/DL (ref 3.5–5.2)
ALP SERPL-CCNC: 63 U/L (ref 40–150)
ALT SERPL W P-5'-P-CCNC: 30 U/L (ref 0–50)
ANION GAP SERPL CALCULATED.3IONS-SCNC: 15 MMOL/L (ref 7–15)
AST SERPL W P-5'-P-CCNC: 39 U/L (ref 0–45)
AUER BODIES BLD QL SMEAR: NORMAL
BASO STIPL BLD QL SMEAR: NORMAL
BASOPHILS # BLD AUTO: 0 10E3/UL (ref 0–0.2)
BASOPHILS NFR BLD AUTO: 1 %
BILIRUB SERPL-MCNC: 0.2 MG/DL
BITE CELLS BLD QL SMEAR: NORMAL
BLISTER CELLS BLD QL SMEAR: NORMAL
BUN SERPL-MCNC: 11.4 MG/DL (ref 6–20)
BURR CELLS BLD QL SMEAR: NORMAL
CALCIUM SERPL-MCNC: 9.5 MG/DL (ref 8.6–10)
CHLORIDE SERPL-SCNC: 100 MMOL/L (ref 98–107)
CREAT SERPL-MCNC: 0.74 MG/DL (ref 0.51–0.95)
DACRYOCYTES BLD QL SMEAR: NORMAL
DEPRECATED HCO3 PLAS-SCNC: 23 MMOL/L (ref 22–29)
EGFRCR SERPLBLD CKD-EPI 2021: >90 ML/MIN/1.73M2
ELLIPTOCYTES BLD QL SMEAR: NORMAL
EOSINOPHIL # BLD AUTO: 0 10E3/UL (ref 0–0.7)
EOSINOPHIL NFR BLD AUTO: 1 %
ERYTHROCYTE [DISTWIDTH] IN BLOOD BY AUTOMATED COUNT: 13.2 % (ref 10–15)
FRAGMENTS BLD QL SMEAR: NORMAL
GLUCOSE SERPL-MCNC: 104 MG/DL (ref 70–99)
HCT VFR BLD AUTO: 44.1 % (ref 35–47)
HGB BLD-MCNC: 14.5 G/DL (ref 11.7–15.7)
HGB C CRYSTALS: NORMAL
HOWELL-JOLLY BOD BLD QL SMEAR: NORMAL
IMM GRANULOCYTES # BLD: 0 10E3/UL
IMM GRANULOCYTES NFR BLD: 1 %
LIPASE SERPL-CCNC: 17 U/L (ref 13–60)
LYMPHOCYTES # BLD AUTO: 1.7 10E3/UL (ref 0.8–5.3)
LYMPHOCYTES NFR BLD AUTO: 47 %
MCH RBC QN AUTO: 30.3 PG (ref 26.5–33)
MCHC RBC AUTO-ENTMCNC: 32.9 G/DL (ref 31.5–36.5)
MCV RBC AUTO: 92 FL (ref 78–100)
MONOCYTES # BLD AUTO: 0.5 10E3/UL (ref 0–1.3)
MONOCYTES NFR BLD AUTO: 14 %
NEUTROPHILS # BLD AUTO: 1.2 10E3/UL (ref 1.6–8.3)
NEUTROPHILS NFR BLD AUTO: 36 %
NEUTS HYPERSEG BLD QL SMEAR: NORMAL
NRBC # BLD AUTO: 0 10E3/UL
NRBC BLD AUTO-RTO: 0 /100
PLAT MORPH BLD: NORMAL
PLATELET # BLD AUTO: 140 10E3/UL (ref 150–450)
POLYCHROMASIA BLD QL SMEAR: NORMAL
POTASSIUM SERPL-SCNC: 3.9 MMOL/L (ref 3.4–5.3)
PROT SERPL-MCNC: 7.6 G/DL (ref 6.4–8.3)
RBC # BLD AUTO: 4.79 10E6/UL (ref 3.8–5.2)
RBC AGGLUT BLD QL: NORMAL
RBC MORPH BLD: NORMAL
ROULEAUX BLD QL SMEAR: NORMAL
SICKLE CELLS BLD QL SMEAR: NORMAL
SMUDGE CELLS BLD QL SMEAR: NORMAL
SODIUM SERPL-SCNC: 138 MMOL/L (ref 135–145)
SPHEROCYTES BLD QL SMEAR: NORMAL
STOMATOCYTES BLD QL SMEAR: NORMAL
TARGETS BLD QL SMEAR: NORMAL
TOXIC GRANULES BLD QL SMEAR: NORMAL
VARIANT LYMPHS BLD QL SMEAR: NORMAL
WBC # BLD AUTO: 3.5 10E3/UL (ref 4–11)

## 2024-04-11 PROCEDURE — 99214 OFFICE O/P EST MOD 30 MIN: CPT

## 2024-04-11 PROCEDURE — 83690 ASSAY OF LIPASE: CPT | Performed by: FAMILY MEDICINE

## 2024-04-11 PROCEDURE — 36415 COLL VENOUS BLD VENIPUNCTURE: CPT

## 2024-04-11 PROCEDURE — 80053 COMPREHEN METABOLIC PANEL: CPT | Performed by: FAMILY MEDICINE

## 2024-04-11 PROCEDURE — 85025 COMPLETE CBC W/AUTO DIFF WBC: CPT | Performed by: FAMILY MEDICINE

## 2024-04-11 RX ORDER — ONDANSETRON 4 MG/1
4 TABLET, ORALLY DISINTEGRATING ORAL EVERY 8 HOURS PRN
Qty: 8 TABLET | Refills: 0 | Status: SHIPPED | OUTPATIENT
Start: 2024-04-11 | End: 2024-04-18

## 2024-04-11 NOTE — PROGRESS NOTES
Assessment & Plan     Stomach pain  Check labs  Could be post viral illness  Recheck labs from ER  Has f/up with PCP next week. Has GI in past  - Comprehensive metabolic panel  - CBC with platelets differential  - Lipase    Gastritis without bleeding, unspecified chronicity, unspecified gastritis type  Pepto bismol  zofran  - ondansetron (ZOFRAN ODT) 4 MG ODT tab  Dispense: 8 tablet; Refill: 0    Diarrhea, unspecified type  Check stool studies  - Enteric Bacteria and Virus Panel by NITIN Stool             No follow-ups on file.    [facility]    Elijah Zamora is a 19 year old female who presents to clinic today for the following health issues:  Chief Complaint   Patient presents with    Urgent Care    Diarrhea     Per pt- throwing up has subsided but no energy regained, eating feels sickening, gross diarrhea or no stool, still RELYING on zofran, I'm panicking and still feel so sick.     HPI    Here with some pain in stomach  Ate some soup.  Has lost weight since Friday.  Started Friday with vomiting and nausea in ER.  Has felt weak.  No black tarry stool.  Pale yellow stool.  Bloated.  Not pregnancy  No urinary symptoms.    No/min alcohol  Some THC. Has stopped.    Reviewed ER visit. Started after eating soup last Friday.        Review of Systems        Objective    /79   Pulse 81   Temp 97.6  F (36.4  C) (Tympanic)   LMP 03/31/2024   SpO2 100%   Physical Exam  Vitals and nursing note reviewed.   Constitutional:       Appearance: Normal appearance.   HENT:      Right Ear: Tympanic membrane and ear canal normal.      Left Ear: Tympanic membrane and ear canal normal.      Mouth/Throat:      Mouth: Mucous membranes are moist.   Eyes:      Pupils: Pupils are equal, round, and reactive to light.   Cardiovascular:      Rate and Rhythm: Normal rate and regular rhythm.      Pulses: Normal pulses.      Heart sounds:  Normal heart sounds.   Pulmonary:      Effort: Pulmonary effort is normal.      Breath sounds: Normal breath sounds.   Abdominal:      General: Abdomen is flat.      Palpations: Abdomen is soft.   Neurological:      Mental Status: She is alert.

## 2024-04-11 NOTE — PATIENT INSTRUCTIONS
I would recommend you  some pepto bismol for the stomach. That can be used up to 4x/day for the stomach.    I will also send in some zofran for the nausea.    BRAT diet

## 2024-04-18 ENCOUNTER — OFFICE VISIT (OUTPATIENT)
Dept: FAMILY MEDICINE | Facility: CLINIC | Age: 19
End: 2024-04-18
Payer: COMMERCIAL

## 2024-04-18 VITALS
SYSTOLIC BLOOD PRESSURE: 104 MMHG | DIASTOLIC BLOOD PRESSURE: 70 MMHG | RESPIRATION RATE: 16 BRPM | OXYGEN SATURATION: 99 % | HEART RATE: 129 BPM | HEIGHT: 65 IN | TEMPERATURE: 98 F | WEIGHT: 99.1 LBS | BODY MASS INDEX: 16.51 KG/M2

## 2024-04-18 DIAGNOSIS — F32.1 CURRENT MODERATE EPISODE OF MAJOR DEPRESSIVE DISORDER WITHOUT PRIOR EPISODE (H): ICD-10-CM

## 2024-04-18 DIAGNOSIS — R79.89 ABNORMAL COMPLETE BLOOD COUNT: Primary | ICD-10-CM

## 2024-04-18 LAB
BASOPHILS # BLD AUTO: 0 10E3/UL (ref 0–0.2)
BASOPHILS NFR BLD AUTO: 0 %
EOSINOPHIL # BLD AUTO: 0.1 10E3/UL (ref 0–0.7)
EOSINOPHIL NFR BLD AUTO: 1 %
ERYTHROCYTE [DISTWIDTH] IN BLOOD BY AUTOMATED COUNT: 13 % (ref 10–15)
HCT VFR BLD AUTO: 40.7 % (ref 35–47)
HGB BLD-MCNC: 13.3 G/DL (ref 11.7–15.7)
IMM GRANULOCYTES # BLD: 0 10E3/UL
IMM GRANULOCYTES NFR BLD: 0 %
LYMPHOCYTES # BLD AUTO: 2 10E3/UL (ref 0.8–5.3)
LYMPHOCYTES NFR BLD AUTO: 33 %
MCH RBC QN AUTO: 30.6 PG (ref 26.5–33)
MCHC RBC AUTO-ENTMCNC: 32.7 G/DL (ref 31.5–36.5)
MCV RBC AUTO: 94 FL (ref 78–100)
MONOCYTES # BLD AUTO: 0.3 10E3/UL (ref 0–1.3)
MONOCYTES NFR BLD AUTO: 5 %
NEUTROPHILS # BLD AUTO: 3.6 10E3/UL (ref 1.6–8.3)
NEUTROPHILS NFR BLD AUTO: 61 %
PLATELET # BLD AUTO: 217 10E3/UL (ref 150–450)
RBC # BLD AUTO: 4.35 10E6/UL (ref 3.8–5.2)
WBC # BLD AUTO: 6 10E3/UL (ref 4–11)

## 2024-04-18 PROCEDURE — 36415 COLL VENOUS BLD VENIPUNCTURE: CPT | Performed by: FAMILY MEDICINE

## 2024-04-18 PROCEDURE — 96127 BRIEF EMOTIONAL/BEHAV ASSMT: CPT | Performed by: FAMILY MEDICINE

## 2024-04-18 PROCEDURE — 99213 OFFICE O/P EST LOW 20 MIN: CPT | Performed by: FAMILY MEDICINE

## 2024-04-18 PROCEDURE — 85025 COMPLETE CBC W/AUTO DIFF WBC: CPT | Performed by: FAMILY MEDICINE

## 2024-04-18 RX ORDER — DEXMETHYLPHENIDATE HYDROCHLORIDE 25 MG/1
CAPSULE, EXTENDED RELEASE ORAL
COMMUNITY
Start: 2024-03-11

## 2024-04-18 ASSESSMENT — PATIENT HEALTH QUESTIONNAIRE - PHQ9
SUM OF ALL RESPONSES TO PHQ QUESTIONS 1-9: 15
10. IF YOU CHECKED OFF ANY PROBLEMS, HOW DIFFICULT HAVE THESE PROBLEMS MADE IT FOR YOU TO DO YOUR WORK, TAKE CARE OF THINGS AT HOME, OR GET ALONG WITH OTHER PEOPLE: SOMEWHAT DIFFICULT
SUM OF ALL RESPONSES TO PHQ QUESTIONS 1-9: 15

## 2024-04-18 NOTE — PROGRESS NOTES
"  Assessment & Plan     (R79.89) Abnormal complete blood count  (primary encounter diagnosis)  Comment: pt had abnormal cbc at 4/11 with low wbc and platelet count. Her nausea, vomiting and diarrhea resolved. No fever, cough, dysuria, abnormal vaginal discharge and bleeding. Normal cbc   Plan: CBC with platelets and differential        No further test is needed now.     (F32.1) Current moderate episode of major depressive disorder without prior episode (H)  Comment: doing well with medication. . She follow-up with psychiatrist  Plan: continue current plan.           MED REC REQUIRED  Post Medication Reconciliation Status: discharge medications reconciled, continue medications without change    See Patient Instructions    Subjective   Sera is a 19 year old, presenting for the following health issues:  ER F/U ([place] ED 4/7 for Vomiting & diarrhea)        4/18/2024    12:41 PM   Additional Questions   Roomed by Gen JENNIFER CMA     [facility]       ED/UC Followup:    Facility:  [facility]  Date of visit: 4/7/2024  Reason for visit: nausea, vomiting and diarrhea  Current Status: resolved.         Review of Systems  Constitutional, HEENT, cardiovascular, pulmonary, gi and gu systems are negative, except as otherwise noted.      Objective    /70 (BP Location: Right arm, Patient Position: Sitting, Cuff Size: Adult Regular)   Pulse (!) 129   Temp 98  F (36.7  C) (Oral)   Resp 16   Ht 1.645 m (5' 4.75\")   Wt 45 kg (99 lb 1.6 oz)   LMP 03/31/2024   SpO2 99%   Breastfeeding No   BMI 16.62 kg/m    Body mass index is 16.62 kg/m .  Physical Exam   GENERAL: alert and no distress  RESP: lungs clear to auscultation - no rales, rhonchi or wheezes  CV: regular rate and rhythm, normal S1 S2, no S3 or S4, no murmur, click or rub, no peripheral edema  ABDOMEN: soft, nontender, no hepatosplenomegaly, no masses and bowel sounds normal           Signed Electronically by: Shania Chery MD    Answers submitted by the patient for this " visit:  Patient Health Questionnaire (Submitted on 4/18/2024)  If you checked off any problems, how difficult have these problems made it for you to do your work, take care of things at home, or get along with other people?: Somewhat difficult  PHQ9 TOTAL SCORE: 15

## 2024-04-22 ENCOUNTER — THERAPY VISIT (OUTPATIENT)
Dept: PHYSICAL THERAPY | Facility: CLINIC | Age: 19
End: 2024-04-22
Payer: COMMERCIAL

## 2024-04-22 DIAGNOSIS — R10.2 PELVIC PAIN IN FEMALE: Primary | ICD-10-CM

## 2024-04-22 DIAGNOSIS — N39.46 MIXED INCONTINENCE: ICD-10-CM

## 2024-04-22 DIAGNOSIS — K59.00 CONSTIPATION, UNSPECIFIED CONSTIPATION TYPE: ICD-10-CM

## 2024-04-22 PROCEDURE — 97140 MANUAL THERAPY 1/> REGIONS: CPT | Mod: GP | Performed by: PHYSICAL THERAPIST

## 2024-04-22 PROCEDURE — 97530 THERAPEUTIC ACTIVITIES: CPT | Mod: GP | Performed by: PHYSICAL THERAPIST

## 2024-05-06 ENCOUNTER — THERAPY VISIT (OUTPATIENT)
Dept: PHYSICAL THERAPY | Facility: CLINIC | Age: 19
End: 2024-05-06
Payer: COMMERCIAL

## 2024-05-06 DIAGNOSIS — K59.00 CONSTIPATION, UNSPECIFIED CONSTIPATION TYPE: ICD-10-CM

## 2024-05-06 DIAGNOSIS — N39.46 MIXED INCONTINENCE: ICD-10-CM

## 2024-05-06 DIAGNOSIS — R10.2 PELVIC PAIN IN FEMALE: Primary | ICD-10-CM

## 2024-05-06 PROCEDURE — 97140 MANUAL THERAPY 1/> REGIONS: CPT | Mod: GP | Performed by: PHYSICAL THERAPIST

## 2024-05-06 PROCEDURE — 97530 THERAPEUTIC ACTIVITIES: CPT | Mod: GP | Performed by: PHYSICAL THERAPIST

## 2024-05-06 PROCEDURE — 97110 THERAPEUTIC EXERCISES: CPT | Mod: GP | Performed by: PHYSICAL THERAPIST

## 2024-05-20 ENCOUNTER — THERAPY VISIT (OUTPATIENT)
Dept: PHYSICAL THERAPY | Facility: CLINIC | Age: 19
End: 2024-05-20
Payer: COMMERCIAL

## 2024-05-20 DIAGNOSIS — K59.00 CONSTIPATION, UNSPECIFIED CONSTIPATION TYPE: ICD-10-CM

## 2024-05-20 DIAGNOSIS — R10.2 PELVIC PAIN IN FEMALE: Primary | ICD-10-CM

## 2024-05-20 DIAGNOSIS — N39.46 MIXED INCONTINENCE: ICD-10-CM

## 2024-05-20 PROCEDURE — 97140 MANUAL THERAPY 1/> REGIONS: CPT | Mod: GP | Performed by: PHYSICAL THERAPIST

## 2024-05-20 PROCEDURE — 97530 THERAPEUTIC ACTIVITIES: CPT | Mod: GP | Performed by: PHYSICAL THERAPIST

## 2024-05-20 NOTE — PROGRESS NOTES
"   05/20/24 0500   Appointment Info   Signing clinician's name / credentials Joyce Phillips, PT, OCS   Total/Authorized Visits epic orders 11/29/23   Visits Used 9   Medical Diagnosis pelvic pain   PT Tx Diagnosis constipation/pelvic pain/mixed incontinence   Precautions/Limitations Discussed with patient/guardian reason for referral regarding pelvic health needs and external/internal pelvic floor muscle examination.  Opportunity provided to ask questions and verbal consent for assessment and intervention was given.   Other pertinent information Goes by \"Radha\"   Quick Adds Pelvic Consent   Progress Note/Certification   Progress Note Due Date 05/24/24   Progress Note Completed Date 05/20/24   GOALS   PT Goals 2   PT Goal 1   Goal Identifier Kegel strength 4   Goal Description for continence throughout the day/night for healthy hygeine   Rationale to maximize safety and independence with performance of ADLs and functional tasks;to maximize safety and independence within the home;to maximize safety and independence within the community;to maximize safety and independence with transportation;to maximize safety and independence with self cares   Goal Progress current 3+, very slight delayed relaxation   Target Date 07/15/24   PT Goal 2   Goal Identifier Able to have full evacuation and painfree BM's   Goal Description 3-4 x week   Rationale to maximize safety and independence with performance of ADLs and functional tasks;to maximize safety and independence within the home;to maximize safety and independence within the community;to maximize safety and independence with transportation;to maximize safety and independence with self cares   Goal Progress current less pain than eval (0-2/10 currently), continues to needs meds, less need for digital stimulation   Target Date 07/15/24   Subjective Report   Subjective Report Overall doing pretty well. Most days will have 1 BM that will come mostly on it's own. Less hard " pellets overall. Splinting with BM overall less often and less intense with splinting.Will occasionally have to hold mm/stretch with toilet paper and this helps. Less tightness in lower abdomen. Did order pelvic wand but not gotten yet. No rectal spasms this week.Did have period and was overall less comfortable. Urinary urgency/leakage also less intense and alot of times still feels like has to put pressure on urethra. But quick flicks also help.   Objective Measures   Objective Measures Objective Measure 1;Objective Measure 2   Objective Measure 1   Objective Measure Kegel strength 3+, slight delayed relaxation. TTP around EAS and perineal body. Improving mm tone, ~ 75-80% of WNL.   Treatment Interventions (PT)   Interventions Manual Therapy;Therapeutic Activity   Therapeutic Procedure/Exercise   Ther Proc 1 bridge 5 sec x 20   Ther Proc 1 - Details SLR abduction AG x 20 each side   PTRx Ther Proc 1 Adductor Stretch   PTRx Ther Proc 1 - Details 1x1-2, hold at least 30 sec daily   PTRx Ther Proc 2 Happy Baby   PTRx Ther Proc 2 - Details 1x1-2, hold at least 30 sec daily   PTRx Ther Proc 3 Pelvic Floor Bulge Position   PTRx Ther Proc 3 - Details 1x1-2 hold for at least 30sec   Therapeutic Activity   Therapeutic Activities: dynamic activities to improve functional performance minutes (52696) 10   Therapeutic Activities Ther Act 2;Ther Act 3   Ther Act 1 educated in POC and normal pelvic floor anatomy/function   Ther Act 1 - Details General Bladder Information   Ther Act 2 Urge Incontinence Suppression Techniques   Ther Act 2 - Details stop, breathe, be the boss, distract   Ther Act 3 Role of Fiber in Constipation Management   Ther Act 3 - Details Pt educated on role of insoluble vs solulble fiber for constipation management   PTRx Ther Act 1 discussed ordering pelvic wand, ok to use vaginally or rectally 5-7 minutes, 2 x week   PTRx Ther Act 1 - Details Rev'd wand techniques, 2x week for 5-7 minutes   PTRx Ther Act 2  Diaphragmatic Breathing   PTRx Ther Act 2 - Details 5-10 min daily  (added visualization of rectum relaxing & passing stool successfully at end of breathing to support NM connection)   PTRx Ther Act 3 Toileting Position   PTRx Ther Act 3 - Details as needed during BM; further reviewed today w/ focus on gently bearing & breathing during BM   Skilled Intervention to improve PF relaxaton   Patient Response/Progress good demonstration/understanding   Manual Therapy   Manual Therapy: Mobilization, MFR, MLD, friction massage minutes (45742) 30   Manual Therapy Manual Therapy 2   Manual Therapy 1 External to EAS x8 min   Manual Therapy 1 - Details 7 min to lower abdomen STM   Manual Therapy 2 MFR to LA/OI vaginally/external to perineum/body   Manual Therapy 2 - Details 15 min   Skilled Intervention STM performed to reduce PFM muscle tension in order to best maximize musclature for continence & pain free vaginal penetration   Patient Response/Progress Pt noted mild discomfort, no pain during manual therapy today.   Education   Learner/Method Patient;No Barriers to Learning   Plan   Home program PTRX HEP   Plan for next session check on wand use   Comments   Pelvic Health Informed Consent Statement Discussed with patient/guardian reason for referral regarding pelvic health needs and external/internal pelvic floor muscle examination.  Opportunity provided to ask questions and verbal consent for assessment and intervention was given.   Total Session Time   Timed Code Treatment Minutes 40   Total Treatment Time (sum of timed and untimed services) 40         PLAN  Continue therapy per current plan of care.    Beginning/End Dates of Progress Note Reporting Period:  05/20/24 to 05/20/2024    Referring Provider:  Shania Chery

## 2024-06-17 ENCOUNTER — THERAPY VISIT (OUTPATIENT)
Dept: PHYSICAL THERAPY | Facility: CLINIC | Age: 19
End: 2024-06-17
Payer: COMMERCIAL

## 2024-06-17 DIAGNOSIS — R10.2 PELVIC PAIN IN FEMALE: Primary | ICD-10-CM

## 2024-06-17 DIAGNOSIS — K59.00 CONSTIPATION, UNSPECIFIED CONSTIPATION TYPE: ICD-10-CM

## 2024-06-17 DIAGNOSIS — N39.46 MIXED INCONTINENCE: ICD-10-CM

## 2024-06-17 PROCEDURE — 97140 MANUAL THERAPY 1/> REGIONS: CPT | Mod: GP | Performed by: PHYSICAL THERAPIST

## 2024-06-17 PROCEDURE — 97530 THERAPEUTIC ACTIVITIES: CPT | Mod: GP | Performed by: PHYSICAL THERAPIST

## 2024-07-02 ENCOUNTER — THERAPY VISIT (OUTPATIENT)
Dept: PHYSICAL THERAPY | Facility: CLINIC | Age: 19
End: 2024-07-02
Payer: COMMERCIAL

## 2024-07-02 DIAGNOSIS — N39.46 MIXED INCONTINENCE: ICD-10-CM

## 2024-07-02 DIAGNOSIS — R10.2 PELVIC PAIN IN FEMALE: Primary | ICD-10-CM

## 2024-07-02 DIAGNOSIS — K59.00 CONSTIPATION, UNSPECIFIED CONSTIPATION TYPE: ICD-10-CM

## 2024-07-02 PROCEDURE — 97530 THERAPEUTIC ACTIVITIES: CPT | Mod: GP | Performed by: PHYSICAL THERAPIST

## 2024-07-02 PROCEDURE — 90912 BFB TRAINING 1ST 15 MIN: CPT | Mod: GP | Performed by: PHYSICAL THERAPIST

## 2024-07-02 PROCEDURE — 90913 BFB TRAINING EA ADDL 15 MIN: CPT | Mod: GP | Performed by: PHYSICAL THERAPIST

## 2024-08-07 ENCOUNTER — THERAPY VISIT (OUTPATIENT)
Dept: PHYSICAL THERAPY | Facility: CLINIC | Age: 19
End: 2024-08-07
Payer: COMMERCIAL

## 2024-08-07 DIAGNOSIS — K59.00 CONSTIPATION, UNSPECIFIED CONSTIPATION TYPE: ICD-10-CM

## 2024-08-07 DIAGNOSIS — R10.2 PELVIC PAIN IN FEMALE: Primary | ICD-10-CM

## 2024-08-07 DIAGNOSIS — N39.46 MIXED INCONTINENCE: ICD-10-CM

## 2024-08-07 PROCEDURE — 90912 BFB TRAINING 1ST 15 MIN: CPT | Mod: GP | Performed by: PHYSICAL THERAPIST

## 2024-08-07 PROCEDURE — 97530 THERAPEUTIC ACTIVITIES: CPT | Mod: GP | Performed by: PHYSICAL THERAPIST

## 2024-08-07 NOTE — PROGRESS NOTES
"Assessment:    Patient has attempted 10 session of Pelvic PT with ongoing difficulties with fullly emptying with bowel movements and intermittent pelvic pain, today completed balloon retraining assessment and treatment with significant findings of hyposensativity noted by patient sensation of strong urge to defecate at 55mL when norms are 120-300ML, requiring ongoing balloon retraining to decrease     PLAN  1x/week for 12 weeks for Balloon Training in addition to manual therapy, neuro-reeducation, there ex, there act    Beginning/End Dates of Progress Note Reporting Period:  05/20/24 to 08/07/2024    Referring Provider:  Shania Chery                08/07/24 0500   Appointment Info   Signing clinician's name / credentials Tawana Taylor, PT, DPT   Total/Authorized Visits epic orders 11/29/23   Visits Used 12   Medical Diagnosis pelvic pain   PT Tx Diagnosis constipation/pelvic pain/mixed incontinence   Precautions/Limitations Discussed with patient/guardian reason for referral regarding pelvic health needs and external/internal pelvic floor muscle examination.  Opportunity provided to ask questions and verbal consent for assessment and intervention was given.   Other pertinent information Goes by \"Radha\"   GOALS   PT Goals 2   PT Goal 1   Goal Identifier Kegel strength 4   Goal Description for continence throughout the day/night for healthy hygeine   Rationale to maximize safety and independence with performance of ADLs and functional tasks;to maximize safety and independence within the home;to maximize safety and independence within the community;to maximize safety and independence with transportation;to maximize safety and independence with self cares   Goal Progress current 3+, very slight delayed relaxation   Target Date 10/07/24   PT Goal 2   Goal Identifier Able to have full evacuation and painfree BM's   Goal Description 3-4 x week   Rationale to maximize safety and independence with performance of ADLs and " functional tasks;to maximize safety and independence within the home;to maximize safety and independence within the community;to maximize safety and independence with transportation;to maximize safety and independence with self cares   Target Date 10/07/24   Subjective Report   Subjective Report Patient reporting when she is at work does better than at home because she is distracted and not always trying to go as often. Reports stretches really help when feels tense an   Objective Measure 1   Objective Measure Balloon Retraining Assessment   Details first sensation 20ml, first urge to defecate 45ml, Strong urge 55ml   Objective Measure 2   Objective Measure Norms:   Details 1st-12-25ml;first urge:35-65ml; strong urge 120-300mL   PT Modalities   PT Modalities Biofeedback   Biofeedback   Pelvic Floor Muscles (PFM) Biofeedback 1st 15 Min ONLY (35852) 15   Treatment Detail Balloon retraining-asseessment and treatment for hypersensatativity using training for increasing rectral stretch capacity   Patient Response/Progress urge started at 40ml, able to increase to 45, 55, 60, then 65, less than at eval- today patient is more tense and found it difficult to relax- reporting she did try to defecate before she came this am twice with digital assist that may have contributed to increased tension and tenderness today   Therapeutic Procedure/Exercise   Ther Proc 1 bridge 5 sec x 20   Ther Proc 1 - Details SLR abduction AG x 20 each side   PTRx Ther Proc 1 Adductor Stretch   PTRx Ther Proc 1 - Details 1x1-2, hold at least 30 sec daily   PTRx Ther Proc 2 Happy Baby   PTRx Ther Proc 2 - Details 1x1-2, hold at least 30 sec daily   PTRx Ther Proc 3 Pelvic Floor Bulge Position   PTRx Ther Proc 3 - Details 1x1-2 hold for at least 30sec   Therapeutic Activity   Therapeutic Activities: dynamic activities to improve functional performance minutes (77284) 25   Ther Act 2 Urge Incontinence Suppression Techniques   Ther Act 2 - Details  reviewed education from last session on how she is feeling the urge too so and to wait to go to the bathroom,   PTRx Ther Act 2 - Details   (added visualization of rectum relaxing & passing stool successfully at end of breathing to support NM connection)   Skilled Intervention skilled education on hyposensitivity noted on exam today with balloon retraining on goal to decrease urge to defecate and waiting to attempt to defecate longer   Patient Response/Progress pt verbalized understanding, reporting how this all seems to make sense overall   Education   Learner/Method Patient;No Barriers to Learning   Plan   Home program [facility]TRX [facility]P   Plan for next session balloon retraining   Comments   Comments Patient has attempted 10 session of Pelvic PT with ongoing difficulties with fullly emptying with bowel movements and intermittent pelvic pain, today completed balloon retraining assessment and treatment with significant findings of hyposensativity noted by patient sensation of strong urge to defecate at 55mL when norms are 120-300ML, requiring ongoing balloon retraining to decrease   Pelvic Health Informed Consent Statement Discussed with patient/guardian reason for referral regarding pelvic health needs and external/internal pelvic floor muscle examination.  Opportunity provided to ask questions and verbal consent for assessment and intervention was given.   Total Session Time   Timed Code Treatment Minutes 40   Total Treatment Time (sum of timed and untimed services) 40

## 2024-09-16 ENCOUNTER — THERAPY VISIT (OUTPATIENT)
Dept: PHYSICAL THERAPY | Facility: CLINIC | Age: 19
End: 2024-09-16
Payer: COMMERCIAL

## 2024-09-16 DIAGNOSIS — N39.46 MIXED INCONTINENCE: ICD-10-CM

## 2024-09-16 DIAGNOSIS — R10.2 PELVIC PAIN IN FEMALE: Primary | ICD-10-CM

## 2024-09-16 DIAGNOSIS — K59.00 CONSTIPATION, UNSPECIFIED CONSTIPATION TYPE: ICD-10-CM

## 2024-09-16 PROCEDURE — 90913 BFB TRAINING EA ADDL 15 MIN: CPT | Mod: GP | Performed by: PHYSICAL THERAPIST

## 2024-09-16 PROCEDURE — 97530 THERAPEUTIC ACTIVITIES: CPT | Mod: GP | Performed by: PHYSICAL THERAPIST

## 2024-09-16 PROCEDURE — 90912 BFB TRAINING 1ST 15 MIN: CPT | Mod: GP | Performed by: PHYSICAL THERAPIST

## 2024-10-02 ENCOUNTER — THERAPY VISIT (OUTPATIENT)
Dept: PHYSICAL THERAPY | Facility: CLINIC | Age: 19
End: 2024-10-02
Payer: COMMERCIAL

## 2024-10-02 DIAGNOSIS — K59.00 CONSTIPATION, UNSPECIFIED CONSTIPATION TYPE: ICD-10-CM

## 2024-10-02 DIAGNOSIS — R10.2 PELVIC PAIN IN FEMALE: Primary | ICD-10-CM

## 2024-10-02 DIAGNOSIS — N39.46 MIXED INCONTINENCE: ICD-10-CM

## 2024-10-02 PROCEDURE — 90913 BFB TRAINING EA ADDL 15 MIN: CPT | Mod: GP | Performed by: PHYSICAL THERAPIST

## 2024-10-02 PROCEDURE — 97530 THERAPEUTIC ACTIVITIES: CPT | Mod: GP | Performed by: PHYSICAL THERAPIST

## 2024-10-02 PROCEDURE — 90912 BFB TRAINING 1ST 15 MIN: CPT | Mod: GP | Performed by: PHYSICAL THERAPIST

## 2024-10-10 ENCOUNTER — THERAPY VISIT (OUTPATIENT)
Dept: PHYSICAL THERAPY | Facility: CLINIC | Age: 19
End: 2024-10-10
Payer: COMMERCIAL

## 2024-10-10 DIAGNOSIS — K59.00 CONSTIPATION, UNSPECIFIED CONSTIPATION TYPE: ICD-10-CM

## 2024-10-10 DIAGNOSIS — N39.46 MIXED INCONTINENCE: ICD-10-CM

## 2024-10-10 DIAGNOSIS — R10.2 PELVIC PAIN IN FEMALE: Primary | ICD-10-CM

## 2024-10-10 PROCEDURE — 90912 BFB TRAINING 1ST 15 MIN: CPT | Mod: GP | Performed by: PHYSICAL THERAPIST

## 2024-10-10 PROCEDURE — 97530 THERAPEUTIC ACTIVITIES: CPT | Mod: GP | Performed by: PHYSICAL THERAPIST

## 2024-10-10 PROCEDURE — 90913 BFB TRAINING EA ADDL 15 MIN: CPT | Mod: GP | Performed by: PHYSICAL THERAPIST

## 2024-10-24 ENCOUNTER — THERAPY VISIT (OUTPATIENT)
Dept: PHYSICAL THERAPY | Facility: CLINIC | Age: 19
End: 2024-10-24
Payer: COMMERCIAL

## 2024-10-24 DIAGNOSIS — R10.2 PELVIC PAIN IN FEMALE: Primary | ICD-10-CM

## 2024-10-24 DIAGNOSIS — K59.00 CONSTIPATION, UNSPECIFIED CONSTIPATION TYPE: ICD-10-CM

## 2024-10-24 DIAGNOSIS — N39.46 MIXED INCONTINENCE: ICD-10-CM

## 2024-10-24 PROCEDURE — 90913 BFB TRAINING EA ADDL 15 MIN: CPT | Mod: GP | Performed by: PHYSICAL THERAPIST

## 2024-10-24 PROCEDURE — 90912 BFB TRAINING 1ST 15 MIN: CPT | Mod: GP | Performed by: PHYSICAL THERAPIST

## 2024-11-07 ENCOUNTER — THERAPY VISIT (OUTPATIENT)
Dept: PHYSICAL THERAPY | Facility: CLINIC | Age: 19
End: 2024-11-07
Payer: COMMERCIAL

## 2024-11-07 DIAGNOSIS — K59.00 CONSTIPATION, UNSPECIFIED CONSTIPATION TYPE: ICD-10-CM

## 2024-11-07 DIAGNOSIS — R10.2 PELVIC PAIN IN FEMALE: Primary | ICD-10-CM

## 2024-11-07 DIAGNOSIS — N39.46 MIXED INCONTINENCE: ICD-10-CM

## 2024-11-07 PROCEDURE — 97530 THERAPEUTIC ACTIVITIES: CPT | Mod: GP | Performed by: PHYSICAL THERAPIST

## 2024-11-07 PROCEDURE — 97140 MANUAL THERAPY 1/> REGIONS: CPT | Mod: GP | Performed by: PHYSICAL THERAPIST

## 2024-11-14 ENCOUNTER — THERAPY VISIT (OUTPATIENT)
Dept: PHYSICAL THERAPY | Facility: CLINIC | Age: 19
End: 2024-11-14
Payer: COMMERCIAL

## 2024-11-14 DIAGNOSIS — R10.2 PELVIC PAIN IN FEMALE: Primary | ICD-10-CM

## 2024-11-14 DIAGNOSIS — N39.46 MIXED INCONTINENCE: ICD-10-CM

## 2024-11-14 DIAGNOSIS — K59.00 CONSTIPATION, UNSPECIFIED CONSTIPATION TYPE: ICD-10-CM

## 2024-11-14 PROCEDURE — 97530 THERAPEUTIC ACTIVITIES: CPT | Mod: GP | Performed by: PHYSICAL THERAPIST

## 2024-11-14 PROCEDURE — 97140 MANUAL THERAPY 1/> REGIONS: CPT | Mod: GP | Performed by: PHYSICAL THERAPIST

## 2024-11-14 NOTE — PROGRESS NOTES
"   11/14/24 0500   Appointment Info   Signing clinician's name / credentials Tawana Taylor, PT, DPT   Total/Authorized Visits epic orders 11/29/23   Visits Used 18   Medical Diagnosis pelvic pain   PT Tx Diagnosis constipation/pelvic pain/mixed incontinence   Precautions/Limitations Discussed with patient/guardian reason for referral regarding pelvic health needs and external/internal pelvic floor muscle examination.  Opportunity provided to ask questions and verbal consent for assessment and intervention was given.   Other pertinent information Goes by \"Radha\"   GOALS   PT Goals 2   PT Goal 1   Goal Identifier Kegel strength 4   Goal Description for continence throughout the day/night for healthy hygeine   Rationale to maximize safety and independence with performance of ADLs and functional tasks;to maximize safety and independence within the home;to maximize safety and independence within the community;to maximize safety and independence with transportation;to maximize safety and independence with self cares   Goal Progress 4+/5   Target Date 10/07/24   Date Met 11/14/24   PT Goal 2   Goal Identifier Able to have full evacuation and painfree BM's   Goal Description 3-4 x week   Rationale to maximize safety and independence with performance of ADLs and functional tasks;to maximize safety and independence within the home;to maximize safety and independence within the community;to maximize safety and independence with transportation;to maximize safety and independence with self cares   Goal Progress in progress   Target Date 10/07/24   Subjective Report   Subjective Report Patient reporting has had some good bowel moverments and other were more difficult   Objective Measures   Objective Measures Objective Measure 6   Objective Measure 1   Objective Measure Balloon Retraining Progression   Details 10-24-24strong 90, 110, 115, 135   Objective Measure 2   Objective Measure Norms:   Details 1st-12-25ml;first urge:35-65ml; " strong urge 120-300mL   Objective Measure 3   Objective Measure Bowel movement urges- 3-4 but 2-3 has been average   Objective Measure 4   Objective Measure bowel sensation to defecate-urge   Details 2-3 on average improved from 8   Objective Measure 5   Objective Measure urinary urgency   Details feels urge,-   Objective Measure 6   Objective Measure pelvic vaginal reassess   Details kegel 4+/5- partial relaxation- bear down partial- left sided internal tightness- note possible rectecele   Therapeutic Activity   Therapeutic Activities: dynamic activities to improve functional performance minutes (88786) 10   Ther Act 1 kegel- relax-bear down   Ther Act 1 - Details X5 good movement noted   Ther Act 2 Urge Incontinence Suppression Techniques   Ther Act 2 - Details pt continues to use supression techniques, and is finding it very helpful with more successful BM   Ther Act 3 pelvic wand   Ther Act 3 - Details pt reporting she used it once-   PTRx Ther Act 1 NEW- vaginal dilators   PTRx Ther Act 1 - Details educated on benefit to relax full pelvic floor- pt to get   Skilled Intervention decrese tension in pelvic floor and improve full ROM   Patient Response/Progress pelvic motion improved today   Manual Therapy   Manual Therapy: Mobilization, MFR, MLD, friction massage minutes (56659) 29   Manual Therapy 1 Manual therapy internal pelvic floor   Manual Therapy 1 - Details STM throughout pelvic floor to decreased tension   Skilled Intervention manual therapy to decrease tension in pelvic floor more on left than right   Patient Response/Progress pt tolerated well- pain free- reporting it feels good- same area that feels tight and where she pushes when having a BM   Education   Learner/Method Patient;No Barriers to Learning   Plan   Home program PTRX HEP   Plan for next session internal manual- full pelvic ROM- combo of pelvic and balloon training   Comments   Pelvic Health Informed Consent Statement Discussed with  patient/guardian reason for referral regarding pelvic health needs and external/internal pelvic floor muscle examination.  Opportunity provided to ask questions and verbal consent for assessment and intervention was given.   Total Session Time   Timed Code Treatment Minutes 39   Total Treatment Time (sum of timed and untimed services) 39             Assessment-    Radha has been seen for 9 visits since 7/2/24 for Pelvic PT with a transition of focus in treatment to Balloon Training. She has progressed significantly with this treatment overall with great improvement in bowel movement urges from 8 times a day to around 2, decreased urge, and improved overall pelvic strength and mobility. Radha will continue to benefit from ongoing Pelvic PT with a combo treatment of manual therapy and balloon training to further progress towards her goals.     PLAN  1x/week for 3 months    Beginning/End Dates of Progress Note Reporting Period:    7/2/24to 11/14/2024    Referring Provider:  Shania Chery

## 2024-12-02 ENCOUNTER — THERAPY VISIT (OUTPATIENT)
Dept: PHYSICAL THERAPY | Facility: CLINIC | Age: 19
End: 2024-12-02
Payer: COMMERCIAL

## 2024-12-02 DIAGNOSIS — R10.2 PELVIC PAIN IN FEMALE: Primary | ICD-10-CM

## 2024-12-02 DIAGNOSIS — N39.46 MIXED INCONTINENCE: ICD-10-CM

## 2024-12-02 DIAGNOSIS — K59.00 CONSTIPATION, UNSPECIFIED CONSTIPATION TYPE: ICD-10-CM

## 2024-12-02 PROCEDURE — 97530 THERAPEUTIC ACTIVITIES: CPT | Mod: GP | Performed by: PHYSICAL THERAPIST

## 2024-12-02 PROCEDURE — 97140 MANUAL THERAPY 1/> REGIONS: CPT | Mod: GP | Performed by: PHYSICAL THERAPIST

## 2024-12-02 PROCEDURE — 90912 BFB TRAINING 1ST 15 MIN: CPT | Mod: GP | Performed by: PHYSICAL THERAPIST

## 2024-12-02 PROCEDURE — 90913 BFB TRAINING EA ADDL 15 MIN: CPT | Mod: GP | Performed by: PHYSICAL THERAPIST

## 2024-12-16 ENCOUNTER — THERAPY VISIT (OUTPATIENT)
Dept: PHYSICAL THERAPY | Facility: CLINIC | Age: 19
End: 2024-12-16
Payer: COMMERCIAL

## 2024-12-16 DIAGNOSIS — R10.2 PELVIC PAIN IN FEMALE: Primary | ICD-10-CM

## 2024-12-16 DIAGNOSIS — K59.00 CONSTIPATION, UNSPECIFIED CONSTIPATION TYPE: ICD-10-CM

## 2024-12-16 DIAGNOSIS — N39.46 MIXED INCONTINENCE: ICD-10-CM

## 2024-12-16 PROCEDURE — 97530 THERAPEUTIC ACTIVITIES: CPT | Mod: GP | Performed by: PHYSICAL THERAPIST

## 2024-12-16 PROCEDURE — 97110 THERAPEUTIC EXERCISES: CPT | Mod: GP | Performed by: PHYSICAL THERAPIST

## 2024-12-19 ENCOUNTER — TELEPHONE (OUTPATIENT)
Dept: FAMILY MEDICINE | Facility: CLINIC | Age: 19
End: 2024-12-19
Payer: COMMERCIAL

## 2024-12-19 NOTE — TELEPHONE ENCOUNTER
Patient Quality Outreach    Patient is due for the following:   Cervical Cancer Screening - PAP Needed  Physical Preventive Adult Physical    Action(s) Taken:   Schedule a Adult Preventative    Type of outreach:    Sent [employer] message.    Questions for provider review:    None           Aurora Davis, [facility]

## 2025-01-13 ENCOUNTER — THERAPY VISIT (OUTPATIENT)
Dept: PHYSICAL THERAPY | Facility: CLINIC | Age: 20
End: 2025-01-13
Payer: COMMERCIAL

## 2025-01-13 DIAGNOSIS — N39.46 MIXED INCONTINENCE: ICD-10-CM

## 2025-01-13 DIAGNOSIS — R10.2 PELVIC PAIN IN FEMALE: Primary | ICD-10-CM

## 2025-01-13 DIAGNOSIS — K59.00 CONSTIPATION, UNSPECIFIED CONSTIPATION TYPE: ICD-10-CM

## 2025-01-13 PROCEDURE — 97530 THERAPEUTIC ACTIVITIES: CPT | Mod: GP | Performed by: PHYSICAL THERAPIST

## 2025-01-13 PROCEDURE — 97110 THERAPEUTIC EXERCISES: CPT | Mod: GP | Performed by: PHYSICAL THERAPIST

## 2025-01-13 PROCEDURE — 97140 MANUAL THERAPY 1/> REGIONS: CPT | Mod: GP | Performed by: PHYSICAL THERAPIST

## 2025-02-03 ENCOUNTER — OFFICE VISIT (OUTPATIENT)
Dept: MIDWIFE SERVICES | Facility: CLINIC | Age: 20
End: 2025-02-03
Payer: COMMERCIAL

## 2025-02-03 VITALS
HEART RATE: 80 BPM | BODY MASS INDEX: 17.99 KG/M2 | SYSTOLIC BLOOD PRESSURE: 110 MMHG | DIASTOLIC BLOOD PRESSURE: 74 MMHG | HEIGHT: 65 IN | WEIGHT: 108 LBS

## 2025-02-03 DIAGNOSIS — K59.00 CONSTIPATION, UNSPECIFIED CONSTIPATION TYPE: ICD-10-CM

## 2025-02-03 DIAGNOSIS — Z23 NEED FOR PROPHYLACTIC VACCINATION AND INOCULATION AGAINST INFLUENZA: ICD-10-CM

## 2025-02-03 DIAGNOSIS — Z01.419 ENCOUNTER FOR WELL WOMAN EXAM WITH ROUTINE GYNECOLOGICAL EXAM: Primary | ICD-10-CM

## 2025-02-03 PROCEDURE — 87591 N.GONORRHOEAE DNA AMP PROB: CPT | Performed by: MIDWIFE

## 2025-02-03 PROCEDURE — 87491 CHLMYD TRACH DNA AMP PROBE: CPT | Performed by: MIDWIFE

## 2025-02-03 PROCEDURE — G0145 SCR C/V CYTO,THINLAYER,RESCR: HCPCS | Performed by: MIDWIFE

## 2025-02-03 RX ORDER — DEXTROAMPHETAMINE SACCHARATE, AMPHETAMINE ASPARTATE MONOHYDRATE, DEXTROAMPHETAMINE SULFATE AND AMPHETAMINE SULFATE 6.25; 6.25; 6.25; 6.25 MG/1; MG/1; MG/1; MG/1
CAPSULE, EXTENDED RELEASE ORAL DAILY
COMMUNITY

## 2025-02-03 RX ORDER — SERTRALINE HYDROCHLORIDE 100 MG/1
1 TABLET, FILM COATED ORAL
COMMUNITY
Start: 2024-12-30

## 2025-02-03 RX ORDER — DEXTROAMPHETAMINE SACCHARATE, AMPHETAMINE ASPARTATE, DEXTROAMPHETAMINE SULFATE AND AMPHETAMINE SULFATE 2.5; 2.5; 2.5; 2.5 MG/1; MG/1; MG/1; MG/1
10 TABLET ORAL DAILY
COMMUNITY

## 2025-02-03 ASSESSMENT — PATIENT HEALTH QUESTIONNAIRE - PHQ9
SUM OF ALL RESPONSES TO PHQ QUESTIONS 1-9: 8
10. IF YOU CHECKED OFF ANY PROBLEMS, HOW DIFFICULT HAVE THESE PROBLEMS MADE IT FOR YOU TO DO YOUR WORK, TAKE CARE OF THINGS AT HOME, OR GET ALONG WITH OTHER PEOPLE: SOMEWHAT DIFFICULT
SUM OF ALL RESPONSES TO PHQ QUESTIONS 1-9: 8

## 2025-02-03 NOTE — NURSING NOTE
Prior to immunization administration, verified patients identity using patient s name and date of birth. Please see Immunization Activity for additional information.     Screening Questionnaire for Adult Immunization    Are you sick today?   No   Do you have allergies to medications, food, a vaccine component or latex?   No   Have you ever had a serious reaction after receiving a vaccination?   No   Do you have a long-term health problem with heart, lung, kidney, or metabolic disease (e.g., diabetes), asthma, a blood disorder, no spleen, complement component deficiency, a cochlear implant, or a spinal fluid leak?  Are you on long-term aspirin therapy?   No   Do you have cancer, leukemia, HIV/AIDS, or any other immune system problem?   No   Do you have a parent, brother, or sister with an immune system problem?   No   In the past 3 months, have you taken medications that affect  your immune system, such as prednisone, other steroids, or anticancer drugs; drugs for the treatment of rheumatoid arthritis, Crohn s disease, or psoriasis; or have you had radiation treatments?   No   Have you had a seizure, or a brain or other nervous system problem?   No   During the past year, have you received a transfusion of blood or blood    products, or been given immune (gamma) globulin or antiviral drug?   No   For women: Are you pregnant or is there a chance you could become       pregnant during the next month?   No   Have you received any vaccinations in the past 4 weeks?   No     Immunization questionnaire answers were all negative.    Covid and flu vaccines given  Patient instructed to remain in clinic for 15 minutes afterwards, and to report any adverse reactions.     Screening performed by Amanda Car LPN on 2/3/2025 at 11:49 AM.

## 2025-02-04 LAB
C TRACH DNA SPEC QL PROBE+SIG AMP: NEGATIVE
N GONORRHOEA DNA SPEC QL NAA+PROBE: NEGATIVE
SPECIMEN TYPE: NORMAL

## 2025-02-06 ENCOUNTER — THERAPY VISIT (OUTPATIENT)
Dept: PHYSICAL THERAPY | Facility: CLINIC | Age: 20
End: 2025-02-06
Payer: COMMERCIAL

## 2025-02-06 DIAGNOSIS — R10.2 PELVIC PAIN IN FEMALE: Primary | ICD-10-CM

## 2025-02-06 DIAGNOSIS — N39.46 MIXED INCONTINENCE: ICD-10-CM

## 2025-02-06 DIAGNOSIS — K59.00 CONSTIPATION, UNSPECIFIED CONSTIPATION TYPE: ICD-10-CM

## 2025-02-06 LAB
BKR LAB AP GYN ADEQUACY: NORMAL
BKR LAB AP GYN INTERPRETATION: NORMAL
BKR LAB AP HPV REFLEX: NO
BKR LAB AP PREVIOUS ABNORMAL: NORMAL
PATH REPORT.COMMENTS IMP SPEC: NORMAL
PATH REPORT.COMMENTS IMP SPEC: NORMAL
PATH REPORT.RELEVANT HX SPEC: NORMAL

## 2025-02-06 PROCEDURE — 97530 THERAPEUTIC ACTIVITIES: CPT | Mod: GP | Performed by: PHYSICAL THERAPIST

## 2025-02-06 PROCEDURE — 97140 MANUAL THERAPY 1/> REGIONS: CPT | Mod: GP | Performed by: PHYSICAL THERAPIST

## 2025-02-06 PROCEDURE — 97161 PT EVAL LOW COMPLEX 20 MIN: CPT | Mod: GP | Performed by: PHYSICAL THERAPIST

## 2025-02-06 NOTE — PROGRESS NOTES
Annual Health Maintenance Exam    Subjective:     Sera is a 19 year old female who presents for an annual exam.  She is a new patient to the [facility]. She reports a healthy year.  She reports regular menstrual cycles every 30 days, lasting 4 days with moderate bleeding.  She is in a mutually monogamous relationship and utilizes condoms and withdrawal for birth control.  She has been treated in the past for issues with complete bowel emptying.  She was evaluated by GI and treated with pelvic floor therapy due to high tone in pelvic floor.  She feels PT has helped her symptoms and would like to continue with PT.  She has also used vaginal dilators to assist in pelvic discomfort with intercourse.  This has helped and she does not have pain currently with IC.  She is accepting STI testing today.   She has a PHQ9 of 8, denies SI and HI.  Has current therapist.   Allergies  Patient has no known allergies.    Current Medications  Current Outpatient Medications   Medication Sig Dispense Refill    amphetamine-dextroamphetamine (ADDERALL XR) 25 MG 24 hr capsule Take by mouth daily.      amphetamine-dextroamphetamine (ADDERALL) 10 MG tablet Take 10 mg by mouth daily.      sertraline (ZOLOFT) 100 MG tablet Take 1 tablet by mouth daily at 2 pm.      traZODone (DESYREL) 50 MG tablet Take 50 mg by mouth at bedtime         Past Medical/Surgical History  Past Medical History:   Diagnosis Date    Depressive disorder 2020     No past surgical history on file.    Obstetric and Gynecologic History  Patient's last menstrual period was 01/17/2025.    OB History   No obstetric history on file.       Last Pap: no history.    Last mammogram: NA.    Immunization History  Status updated.    Family History  Family History   Problem Relation Age of Onset    Diabetes Mother         type 1    Depression Mother     Coronary Artery Disease Mother     Cancer Maternal Grandmother     Breast Cancer Maternal Grandmother      Cerebrovascular Disease Paternal Grandmother     Anxiety Disorder Sister     Depression Sister        Health Maintenance  Diet:  general  Regular Exercise: Yes.  Cards and walking.    Caffeine use:  yes  Sunscreen Use: Yes.   Dental Visits Regularly: Yes  Seat Belt: Yes  Self Breast Exam Monthly:Yes  Abuse History:  Does not have a history of abuse.  Currently does feel safe in all her relationships.      Social History  Social History     Socioeconomic History    Marital status: Single     Spouse name: Not on file    Number of children: Not on file    Years of education: Not on file    Highest education level: Not on file   Occupational History    Not on file   Tobacco Use    Smoking status: Some Days     Types: Other     Passive exposure: Yes    Smokeless tobacco: Current    Tobacco comments:     I would smoke a cig maybe once a day when I worked at a [employer]   Vaping Use    Vaping status: Never Used   Substance and Sexual Activity    Alcohol use: No    Drug use: Yes     Types: Marijuana     Comment: helps my anxiety/stomach    Sexual activity: Yes     Partners: Male     Birth control/protection: Pull-out method, Condom     Comment: always with a condom and always pulls out   Other Topics Concern    Parent/sibling w/ CABG, MI or angioplasty before 65F 55M? No   Social History Narrative    Not on file     Social Drivers of Health     Financial Resource Strain: Low Risk  (11/29/2023)    Financial Resource Strain     Within the past 12 months, have you or your family members you live with been unable to get utilities (heat, electricity) when it was really needed?: No   Food Insecurity: High Risk (11/29/2023)    Food Insecurity     Within the past 12 months, did you worry that your food would run out before you got money to buy more?: Yes     Within the past 12 months, did the food you bought just not last and you didn t have money to get more?: No   Transportation Needs: Low Risk  (11/29/2023)    Transportation  "Needs     Within the past 12 months, has lack of transportation kept you from medical appointments, getting your medicines, non-medical meetings or appointments, work, or from getting things that you need?: No   Physical Activity: Not on file   Stress: Not on file   Social Connections: Not on file   Interpersonal Safety: Low Risk  (2/3/2025)    Interpersonal Safety     Do you feel physically and emotionally safe where you currently live?: Yes     Within the past 12 months, have you been hit, slapped, kicked or otherwise physically hurt by someone?: No     Within the past 12 months, have you been humiliated or emotionally abused in other ways by your partner or ex-partner?: No   Housing Stability: Low Risk  (11/29/2023)    Housing Stability     Do you have housing? : Yes     Are you worried about losing your housing?: No       Further Screening History  Colonoscopy: NA.  Lipid Profile: Na.  Glucose Screen: na.  Last Dexa: NA.    Review of Systems  A 12 point comprehensive review of systems was negative except as noted.    Objective:      [unfilled]  Vitals:    02/03/25 1047   BP: 110/74   Pulse: 80   Weight: 49 kg (108 lb)   Height: 1.651 m (5' 5\")       Physical Exam:  General: Pleasant, articulate, well-groomed, well-nourished female.  Not in any apparent distress.  Neck: Supple.  Thyroid: Small, symmetrical, no nodules noted.  Lymphadenopathy: Negative.  Lungs: Clear to auscultation bilaterally, and a nonlabored breathing pattern.  Cardio: Regular rate and rhythm, negative for murmur.   Breasts: Symmetrical, nontender, no masses, negative lymphadenopathy, negative nipple discharge.  Abdomen: Soft, nontender, no masses, negative CVAT.  External genitalia: Normal hair distribution, no lesions.  Urethral opening: Without lesions, or tenderness.   Bladder: Without masses, or tenderness.  Vagina: Pink, rugated, normal-appearing discharge. Pelvic tone assessed, narrow pubic arch noted as well as high posterior " vaginal tone.  Cervix: posterior, pink, smooth, no lesions.  Pap smear was obtained.  Bimanual: Finds uterus small anteverted, mobile, nontender, no masses.  Negative CMT.  Adnexa: without masses or tenderness.    Lower extremities: +1 reflexes, no significant edema.    Assessment / Plan:     1) Annual health maintenance exam generally within normal limits today.  Pap smear collected.  2) Pelvic floor dysfunction- high tone    1. Discussed nutrition and exercise.  Advised MVI, Vitamin D3 4000IU geltab and an omega 3 supplement daily.  Accepts the following HM labs: NA.   Breast self exam technique reviewed and patient encouraged to perform self-exam monthly. Contraception: condoms.  2.  Pelvic health PT referral provided.  Reviewed self care and exercises utilized vaginal dilators.    3. Elevated PHQ9 with current therapist through [facility].   4.  RTC 1 year for annual physical exam, or PRN.    40 minutes on the date of the encounter doing chart review, interpretation of tests, patient visit, documentation, and discussion with family      Answers submitted by the patient for this visit:  Patient Health Questionnaire (Submitted on 2/3/2025)  If you checked off any problems, how difficult have these problems made it for you to do your work, take care of things at home, or get along with other people?: Somewhat difficult  PHQ9 TOTAL SCORE: 8

## 2025-02-10 NOTE — PROGRESS NOTES
Assessment- Patient has been seen for Pelvic PT this past year under different provider/referral with focus  on pelvic pain and urinary symptoms. New referral to focus on ongoing constipation with different treatment strategy.   See above for goals      CLINICAL IMPRESSIONS  Medical Diagnosis: constipation  Treatment Diagnosis: constipation  Impression/Assessment: Patient is a 19 year old female with ongoing pelvic floor/constipation complaints.  The following significant findings have been identified: Pain, Decreased ROM/flexibility, Decreased strength, Decreased proprioception, Inflammation, Impaired muscle performance, and Decreased activity tolerance. These impairments interfere with their ability to perform self care tasks, work tasks, recreational activities, household chores, household mobility, and community mobility as compared to previous level of function.      Clinical Decision Making (Complexity):  Clinical Presentation: stable  Clinical Presentation Rationale: based on medical and personal factors listed in PT evaluation  Clinical Decision Making (Complexity): Moderate complexity     PLAN OF CARE  Treatment Interventions:  Interventions: Manual Therapy, Neuromuscular Re-education, Therapeutic Activity, Therapeutic Exercise, Self-Care/Home Management        Referring Provider:  Magi Gallo

## 2025-02-10 NOTE — PROGRESS NOTES
"   02/06/25 0500   Appointment Info   Signing clinician's name / credentials Tawana Taylor, PT, DPT   Total/Authorized Visits eval and treat-NEW referral 2-3-25   Visits Used 1   Medical Diagnosis constipation   PT Tx Diagnosis constipation   Other pertinent information Goes by \"Radha\"   Progress Note/Certification   Onset of illness/injury or Date of Surgery 02/03/25  (date of referral as this is chronic- new referral)   Therapy Frequency 1x/week   Predicted Duration 12 weeks   Progress Note Due Date 05/02/25   Progress Note Completed Date 02/06/25   GOALS   PT Goals 2   PT Goal 2   Goal Identifier Constipation   Goal Description Pt will have pain free full evacuation bowel movements to normalize bowel function and decrease pelvic pain and dysfunction   Rationale to maximize safety and independence with performance of ADLs and functional tasks;to maximize safety and independence within the home;to maximize safety and independence within the community;to maximize safety and independence with transportation;to maximize safety and independence with self cares   Goal Progress in progress   Target Date 05/02/25   Objective Measure 1   Objective Measure bowel movements   Details improved since start of initial care- however still splints- still feels as though rectum is at an simón   Objective Measure 2   Objective Measure splinting to have BM   Details pt splints to have BM about 1/2 time-   Objective Measure 3   Objective Measure pelvic vaginal reassess   Details kegel 5/5- partial relaxation- full bear down- tight throough out- puborectalis very tight   Objective Measure 4   Objective Measure Rectal exam   Details tight- taught levator ani/puborectalis   Therapeutic Procedure/Exercise   PTRx Ther Proc 1 Piriformis Stretch Below 90 Degrees Supine   PTRx Ther Proc 1 - Details HEP   PTRx Ther Proc 2 Pretzel Stretch   PTRx Ther Proc 2 - Details HEP   PTRx Ther Proc 3 Happy Baby   PTRx Ther Proc 3 - Details HEP   PTRx Ther " "Proc 4 Pelvic Floor Bulge Position   PTRx Ther Proc 4 - Details HEP   PTRx Ther Proc 5 Gluteal Myofascial Full Arc   PTRx Ther Proc 5 - Details HEP   Skilled Intervention verbal review   Patient Response/Progress verbal review- pt IND   Therapeutic Activity   Therapeutic Activities: dynamic activities to improve functional performance minutes (52901) 10   Ther Act 3 pelvic wand   Ther Act 3 - Details pt has started using more- daily to decrease tension   PTRx Ther Act 1 Vaginal dilators   PTRx Ther Act 1 - Details pt has dilators up to level 6-  is up to level 5, is using regular especially when feeling tight,  uses it as a wand   PTRx Ther Act 2 anal dilators   PTRx Ther Act 2 - Details educated on benefit- after rectal exam and treatment today- pt will now get them- as they will benefit with use dialy   Skilled Intervention to improve full relaxed bowel movements   Patient Response/Progress pt to get anal dilators for home use   Manual Therapy   Manual Therapy: Mobilization, MFR, MLD, friction massage minutes (68647) 13   Manual Therapy 1 rectal exam and manual therapy   Manual Therapy 1 - Details pt very tight- manual therapy to decrease tension   Skilled Intervention decrease tension and increase ease of bowel movements   Patient Response/Progress pt toleraed well- repoted less tender then expected however feels this bests reaches her \"tight muscles\"   Eval/Assessments   PT Eval, Low Complexity Minutes (14134) 20   Education   Learner/Method Patient;No Barriers to Learning   Plan   Home program PTRX HEP   Plan for next session continue rectal manual- rectal dilators   Comments   Pelvic Health Informed Consent Statement Discussed with patient/guardian reason for referral regarding pelvic health needs and external/internal pelvic floor muscle examination.  Opportunity provided to ask questions and verbal consent for assessment and intervention was given.   Total Session Time   Timed Code Treatment Minutes 23 "   Total Treatment Time (sum of timed and untimed services) 43     Assessment- Patient has been seen for Pelvic PT this past year under different provider/referral with focus  on pelvic pain and urinary symptoms. New referral to focus on ongoing constipation with different treatment strategy.   See above for goals      CLINICAL IMPRESSIONS  Medical Diagnosis: constipation  Treatment Diagnosis: constipation  Impression/Assessment: Patient is a 19 year old female with ongoing pelvic floor/constipation complaints.  The following significant findings have been identified: Pain, Decreased ROM/flexibility, Decreased strength, Decreased proprioception, Inflammation, Impaired muscle performance, and Decreased activity tolerance. These impairments interfere with their ability to perform self care tasks, work tasks, recreational activities, household chores, household mobility, and community mobility as compared to previous level of function.      Clinical Decision Making (Complexity):  Clinical Presentation: stable  Clinical Presentation Rationale: based on medical and personal factors listed in PT evaluation  Clinical Decision Making (Complexity): Moderate complexity     PLAN OF CARE  Treatment Interventions:  Interventions: Manual Therapy, Neuromuscular Re-education, Therapeutic Activity, Therapeutic Exercise, Self-Care/Home Management        Referring Provider:  Magi Gallo

## 2025-03-07 ENCOUNTER — THERAPY VISIT (OUTPATIENT)
Dept: PHYSICAL THERAPY | Facility: CLINIC | Age: 20
End: 2025-03-07
Payer: COMMERCIAL

## 2025-03-07 DIAGNOSIS — N39.46 MIXED INCONTINENCE: ICD-10-CM

## 2025-03-07 DIAGNOSIS — R10.2 PELVIC PAIN IN FEMALE: Primary | ICD-10-CM

## 2025-03-07 DIAGNOSIS — K59.00 CONSTIPATION, UNSPECIFIED CONSTIPATION TYPE: ICD-10-CM

## 2025-03-07 PROCEDURE — 97530 THERAPEUTIC ACTIVITIES: CPT | Mod: GP | Performed by: PHYSICAL THERAPIST

## 2025-03-07 PROCEDURE — 97140 MANUAL THERAPY 1/> REGIONS: CPT | Mod: GP | Performed by: PHYSICAL THERAPIST

## 2025-04-03 ENCOUNTER — THERAPY VISIT (OUTPATIENT)
Dept: PHYSICAL THERAPY | Facility: CLINIC | Age: 20
End: 2025-04-03
Payer: COMMERCIAL

## 2025-04-03 DIAGNOSIS — R10.2 PELVIC PAIN IN FEMALE: Primary | ICD-10-CM

## 2025-04-03 DIAGNOSIS — N39.46 MIXED INCONTINENCE: ICD-10-CM

## 2025-04-03 DIAGNOSIS — K59.00 CONSTIPATION, UNSPECIFIED CONSTIPATION TYPE: ICD-10-CM

## 2025-05-15 ENCOUNTER — THERAPY VISIT (OUTPATIENT)
Dept: PHYSICAL THERAPY | Facility: CLINIC | Age: 20
End: 2025-05-15
Payer: COMMERCIAL

## 2025-05-15 DIAGNOSIS — N39.46 MIXED INCONTINENCE: ICD-10-CM

## 2025-05-15 DIAGNOSIS — R10.2 PELVIC PAIN IN FEMALE: Primary | ICD-10-CM

## 2025-05-15 DIAGNOSIS — K59.00 CONSTIPATION, UNSPECIFIED CONSTIPATION TYPE: ICD-10-CM
